# Patient Record
Sex: FEMALE | Race: BLACK OR AFRICAN AMERICAN | NOT HISPANIC OR LATINO | Employment: OTHER | URBAN - NONMETROPOLITAN AREA
[De-identification: names, ages, dates, MRNs, and addresses within clinical notes are randomized per-mention and may not be internally consistent; named-entity substitution may affect disease eponyms.]

---

## 2023-01-25 PROBLEM — D50.9 MICROCYTIC ANEMIA: Status: ACTIVE | Noted: 2023-01-25

## 2023-01-25 PROBLEM — R10.30 LOWER ABDOMINAL PAIN: Status: ACTIVE | Noted: 2023-01-25

## 2023-01-25 PROBLEM — D63.1 ANEMIA IN STAGE 3A CHRONIC KIDNEY DISEASE (MULTI): Status: ACTIVE | Noted: 2023-01-25

## 2023-01-25 PROBLEM — R06.02 SOB (SHORTNESS OF BREATH) ON EXERTION: Status: ACTIVE | Noted: 2023-01-25

## 2023-01-25 PROBLEM — M25.50 JOINT PAIN: Status: ACTIVE | Noted: 2023-01-25

## 2023-01-25 PROBLEM — R53.82 CHRONIC FATIGUE: Status: ACTIVE | Noted: 2023-01-25

## 2023-01-25 PROBLEM — E78.5 HYPERLIPIDEMIA: Status: ACTIVE | Noted: 2023-01-25

## 2023-01-25 PROBLEM — R26.81 UNSTABLE GAIT: Status: ACTIVE | Noted: 2023-01-25

## 2023-01-25 PROBLEM — J44.9 COPD (CHRONIC OBSTRUCTIVE PULMONARY DISEASE) (MULTI): Status: ACTIVE | Noted: 2023-01-25

## 2023-01-25 PROBLEM — M54.50 LOW BACK PAIN: Status: ACTIVE | Noted: 2023-01-25

## 2023-01-25 PROBLEM — R07.9 EXERTIONAL CHEST PAIN: Status: ACTIVE | Noted: 2023-01-25

## 2023-01-25 PROBLEM — I10 BENIGN ESSENTIAL HYPERTENSION: Status: ACTIVE | Noted: 2023-01-25

## 2023-01-25 PROBLEM — N18.31 ANEMIA IN STAGE 3A CHRONIC KIDNEY DISEASE (MULTI): Status: ACTIVE | Noted: 2023-01-25

## 2023-01-25 PROBLEM — E11.9 TYPE 2 DIABETES MELLITUS (MULTI): Status: ACTIVE | Noted: 2023-01-25

## 2023-01-25 PROBLEM — M17.10 ARTHRITIS OF KNEE: Status: ACTIVE | Noted: 2023-01-25

## 2023-01-25 PROBLEM — N39.0 UTI (URINARY TRACT INFECTION): Status: ACTIVE | Noted: 2023-01-25

## 2023-01-25 PROBLEM — R31.9 HEMATURIA: Status: ACTIVE | Noted: 2023-01-25

## 2023-01-25 PROBLEM — M25.562 LEFT KNEE PAIN: Status: ACTIVE | Noted: 2023-01-25

## 2023-01-25 PROBLEM — E55.9 VITAMIN D DEFICIENCY: Status: ACTIVE | Noted: 2023-01-25

## 2023-01-25 PROBLEM — J45.909 ASTHMA (HHS-HCC): Status: ACTIVE | Noted: 2023-01-25

## 2023-01-25 PROBLEM — M79.89 LEG SWELLING: Status: ACTIVE | Noted: 2023-01-25

## 2023-01-25 PROBLEM — R35.0 FREQUENCY OF URINATION: Status: ACTIVE | Noted: 2023-01-25

## 2023-01-25 PROBLEM — M10.9 GOUT: Status: ACTIVE | Noted: 2023-01-25

## 2023-01-25 PROBLEM — E11.40 DIABETIC NEUROPATHY (MULTI): Status: ACTIVE | Noted: 2023-01-25

## 2023-01-25 RX ORDER — ALBUTEROL SULFATE 90 UG/1
2 AEROSOL, METERED RESPIRATORY (INHALATION) EVERY 4 HOURS PRN
COMMUNITY
Start: 2017-09-07 | End: 2023-12-27 | Stop reason: SDUPTHER

## 2023-01-25 RX ORDER — ASPIRIN 81 MG/1
81 TABLET ORAL DAILY
COMMUNITY
Start: 2022-06-13

## 2023-01-25 RX ORDER — FUROSEMIDE 20 MG/1
20 TABLET ORAL DAILY
COMMUNITY
Start: 2022-03-09 | End: 2023-05-22 | Stop reason: SDUPTHER

## 2023-01-25 RX ORDER — LOVASTATIN 20 MG/1
20 TABLET ORAL
COMMUNITY
Start: 2015-10-01 | End: 2023-05-22 | Stop reason: SDUPTHER

## 2023-01-25 RX ORDER — GLIPIZIDE 5 MG/1
5 TABLET ORAL
COMMUNITY
Start: 2015-10-01 | End: 2023-05-22 | Stop reason: SDUPTHER

## 2023-01-25 RX ORDER — LIDOCAINE 560 MG/1
PATCH PERCUTANEOUS; TOPICAL; TRANSDERMAL AS NEEDED
COMMUNITY
Start: 2021-10-07

## 2023-01-25 RX ORDER — CHOLECALCIFEROL (VITAMIN D3) 125 MCG
1 CAPSULE ORAL
COMMUNITY

## 2023-01-25 RX ORDER — ALLOPURINOL 100 MG/1
2 TABLET ORAL DAILY
COMMUNITY
Start: 2021-10-07 | End: 2023-05-22 | Stop reason: SDUPTHER

## 2023-01-25 RX ORDER — METFORMIN HYDROCHLORIDE 500 MG/1
500 TABLET, EXTENDED RELEASE ORAL EVERY 12 HOURS
COMMUNITY
Start: 2022-04-12 | End: 2023-05-22 | Stop reason: SDUPTHER

## 2023-01-25 RX ORDER — LISINOPRIL AND HYDROCHLOROTHIAZIDE 12.5; 2 MG/1; MG/1
1 TABLET ORAL DAILY
COMMUNITY
Start: 2015-10-07 | End: 2023-02-06 | Stop reason: ENTERED-IN-ERROR

## 2023-02-06 PROBLEM — Z99.81 OXYGEN DEPENDENT: Status: ACTIVE | Noted: 2023-02-06

## 2023-02-06 RX ORDER — BUDESONIDE, GLYCOPYRROLATE, AND FORMOTEROL FUMARATE 160; 9; 4.8 UG/1; UG/1; UG/1
2 AEROSOL, METERED RESPIRATORY (INHALATION) 2 TIMES DAILY
COMMUNITY
End: 2023-12-27 | Stop reason: SDUPTHER

## 2023-02-06 RX ORDER — ALBUTEROL SULFATE 0.83 MG/ML
2.5 SOLUTION RESPIRATORY (INHALATION)
COMMUNITY
End: 2023-12-27 | Stop reason: SDUPTHER

## 2023-02-06 RX ORDER — FERROUS GLUCONATE 325 MG
1 TABLET ORAL DAILY
COMMUNITY
End: 2023-05-22 | Stop reason: SDUPTHER

## 2023-02-06 RX ORDER — LISINOPRIL 20 MG/1
20 TABLET ORAL DAILY
COMMUNITY
End: 2023-05-19 | Stop reason: SDUPTHER

## 2023-02-06 RX ORDER — BLOOD SUGAR DIAGNOSTIC
STRIP MISCELLANEOUS
COMMUNITY
End: 2023-05-22 | Stop reason: SDUPTHER

## 2023-02-06 RX ORDER — LANCETS
EACH MISCELLANEOUS
COMMUNITY

## 2023-02-20 LAB — URATE (MG/DL) IN SER/PLAS: 5.1 MG/DL (ref 2.3–6.7)

## 2023-03-09 ENCOUNTER — APPOINTMENT (OUTPATIENT)
Dept: PRIMARY CARE | Facility: CLINIC | Age: 78
End: 2023-03-09
Payer: MEDICARE

## 2023-05-19 DIAGNOSIS — I10 ESSENTIAL (PRIMARY) HYPERTENSION: ICD-10-CM

## 2023-05-19 RX ORDER — LISINOPRIL 20 MG/1
TABLET ORAL
Qty: 90 TABLET | Refills: 0 | Status: SHIPPED | OUTPATIENT
Start: 2023-05-19 | End: 2023-05-22 | Stop reason: SDUPTHER

## 2023-05-22 ENCOUNTER — OFFICE VISIT (OUTPATIENT)
Dept: PRIMARY CARE | Facility: CLINIC | Age: 78
End: 2023-05-22
Payer: MEDICARE

## 2023-05-22 VITALS
WEIGHT: 126.2 LBS | TEMPERATURE: 97.6 F | SYSTOLIC BLOOD PRESSURE: 112 MMHG | DIASTOLIC BLOOD PRESSURE: 64 MMHG | BODY MASS INDEX: 24.77 KG/M2 | HEIGHT: 60 IN | OXYGEN SATURATION: 94 % | HEART RATE: 76 BPM

## 2023-05-22 DIAGNOSIS — I73.9 PVD (PERIPHERAL VASCULAR DISEASE) (CMS-HCC): ICD-10-CM

## 2023-05-22 DIAGNOSIS — D63.1 ANEMIA IN STAGE 3A CHRONIC KIDNEY DISEASE (MULTI): ICD-10-CM

## 2023-05-22 DIAGNOSIS — N18.31 ANEMIA IN STAGE 3A CHRONIC KIDNEY DISEASE (MULTI): ICD-10-CM

## 2023-05-22 DIAGNOSIS — E11.69 TYPE 2 DIABETES MELLITUS WITH OTHER SPECIFIED COMPLICATION, UNSPECIFIED WHETHER LONG TERM INSULIN USE (MULTI): ICD-10-CM

## 2023-05-22 DIAGNOSIS — E53.8 VITAMIN B12 DEFICIENCY: ICD-10-CM

## 2023-05-22 DIAGNOSIS — R53.83 OTHER FATIGUE: ICD-10-CM

## 2023-05-22 DIAGNOSIS — M06.09 RHEUMATOID ARTHRITIS OF MULTIPLE SITES WITH NEGATIVE RHEUMATOID FACTOR (MULTI): ICD-10-CM

## 2023-05-22 DIAGNOSIS — Z00.00 ROUTINE GENERAL MEDICAL EXAMINATION AT HEALTH CARE FACILITY: Primary | ICD-10-CM

## 2023-05-22 DIAGNOSIS — E55.9 VITAMIN D DEFICIENCY, UNSPECIFIED: ICD-10-CM

## 2023-05-22 DIAGNOSIS — E78.5 HYPERLIPIDEMIA, UNSPECIFIED HYPERLIPIDEMIA TYPE: ICD-10-CM

## 2023-05-22 DIAGNOSIS — Z79.899 HIGH RISK MEDICATION USE: ICD-10-CM

## 2023-05-22 DIAGNOSIS — Z12.31 ENCOUNTER FOR SCREENING MAMMOGRAM FOR MALIGNANT NEOPLASM OF BREAST: ICD-10-CM

## 2023-05-22 DIAGNOSIS — I10 BENIGN ESSENTIAL HYPERTENSION: ICD-10-CM

## 2023-05-22 DIAGNOSIS — I10 ESSENTIAL (PRIMARY) HYPERTENSION: ICD-10-CM

## 2023-05-22 DIAGNOSIS — E78.00 HYPERCHOLESTEREMIA: ICD-10-CM

## 2023-05-22 DIAGNOSIS — I10 HYPERTENSION, UNSPECIFIED TYPE: ICD-10-CM

## 2023-05-22 DIAGNOSIS — M1A.0790 CHRONIC GOUT OF FOOT, UNSPECIFIED CAUSE, UNSPECIFIED LATERALITY: ICD-10-CM

## 2023-05-22 DIAGNOSIS — Z13.89 SCREENING FOR MULTIPLE CONDITIONS: ICD-10-CM

## 2023-05-22 DIAGNOSIS — J96.91 RESPIRATORY FAILURE WITH HYPOXIA, UNSPECIFIED CHRONICITY (MULTI): ICD-10-CM

## 2023-05-22 LAB
POC FINGERSTICK BLOOD GLUCOSE: 134 MG/DL (ref 70–100)
POC HEMOGLOBIN A1C: 5.6 % (ref 4.2–6.5)

## 2023-05-22 PROCEDURE — G0444 DEPRESSION SCREEN ANNUAL: HCPCS | Performed by: INTERNAL MEDICINE

## 2023-05-22 PROCEDURE — 3078F DIAST BP <80 MM HG: CPT | Performed by: INTERNAL MEDICINE

## 2023-05-22 PROCEDURE — 83036 HEMOGLOBIN GLYCOSYLATED A1C: CPT | Performed by: INTERNAL MEDICINE

## 2023-05-22 PROCEDURE — 1159F MED LIST DOCD IN RCRD: CPT | Performed by: INTERNAL MEDICINE

## 2023-05-22 PROCEDURE — 3074F SYST BP LT 130 MM HG: CPT | Performed by: INTERNAL MEDICINE

## 2023-05-22 PROCEDURE — 82962 GLUCOSE BLOOD TEST: CPT | Performed by: INTERNAL MEDICINE

## 2023-05-22 PROCEDURE — 1036F TOBACCO NON-USER: CPT | Performed by: INTERNAL MEDICINE

## 2023-05-22 PROCEDURE — 1160F RVW MEDS BY RX/DR IN RCRD: CPT | Performed by: INTERNAL MEDICINE

## 2023-05-22 PROCEDURE — G0439 PPPS, SUBSEQ VISIT: HCPCS | Performed by: INTERNAL MEDICINE

## 2023-05-22 PROCEDURE — 99214 OFFICE O/P EST MOD 30 MIN: CPT | Performed by: INTERNAL MEDICINE

## 2023-05-22 PROCEDURE — 99397 PER PM REEVAL EST PAT 65+ YR: CPT | Performed by: INTERNAL MEDICINE

## 2023-05-22 PROCEDURE — 1170F FXNL STATUS ASSESSED: CPT | Performed by: INTERNAL MEDICINE

## 2023-05-22 RX ORDER — PNEUMOCOCCAL 20-VALENT CONJUGATE VACCINE 2.2; 2.2; 2.2; 2.2; 2.2; 2.2; 2.2; 2.2; 2.2; 2.2; 2.2; 2.2; 2.2; 2.2; 2.2; 2.2; 4.4; 2.2; 2.2; 2.2 UG/.5ML; UG/.5ML; UG/.5ML; UG/.5ML; UG/.5ML; UG/.5ML; UG/.5ML; UG/.5ML; UG/.5ML; UG/.5ML; UG/.5ML; UG/.5ML; UG/.5ML; UG/.5ML; UG/.5ML; UG/.5ML; UG/.5ML; UG/.5ML; UG/.5ML; UG/.5ML
0.5 INJECTION, SUSPENSION INTRAMUSCULAR ONCE
Qty: 0.5 ML | Refills: 0 | Status: SHIPPED | OUTPATIENT
Start: 2023-05-22 | End: 2023-05-22

## 2023-05-22 RX ORDER — LISINOPRIL 20 MG/1
20 TABLET ORAL DAILY
Qty: 90 TABLET | Refills: 1 | Status: SHIPPED | OUTPATIENT
Start: 2023-05-22 | End: 2023-11-06

## 2023-05-22 RX ORDER — LOVASTATIN 20 MG/1
20 TABLET ORAL
Qty: 90 TABLET | Refills: 1 | Status: SHIPPED | OUTPATIENT
Start: 2023-05-22 | End: 2023-11-20

## 2023-05-22 RX ORDER — ALLOPURINOL 100 MG/1
200 TABLET ORAL 2 TIMES DAILY
Qty: 180 TABLET | Refills: 1 | Status: SHIPPED | OUTPATIENT
Start: 2023-05-22 | End: 2023-05-22 | Stop reason: SINTOL

## 2023-05-22 RX ORDER — METFORMIN HYDROCHLORIDE 500 MG/1
500 TABLET, EXTENDED RELEASE ORAL EVERY 12 HOURS
Qty: 90 TABLET | Refills: 1 | Status: SHIPPED | OUTPATIENT
Start: 2023-05-22 | End: 2023-08-23

## 2023-05-22 RX ORDER — BLOOD SUGAR DIAGNOSTIC
1 STRIP MISCELLANEOUS 2 TIMES DAILY
Qty: 50 STRIP | Refills: 3 | Status: SHIPPED | OUTPATIENT
Start: 2023-05-22 | End: 2023-07-17 | Stop reason: SDUPTHER

## 2023-05-22 RX ORDER — FERROUS GLUCONATE 325 MG
1 TABLET ORAL
Qty: 90 TABLET | Refills: 1 | Status: SHIPPED | OUTPATIENT
Start: 2023-05-22 | End: 2024-01-29

## 2023-05-22 RX ORDER — FUROSEMIDE 20 MG/1
20 TABLET ORAL DAILY
Qty: 90 TABLET | Refills: 1 | Status: SHIPPED | OUTPATIENT
Start: 2023-05-22 | End: 2023-08-31

## 2023-05-22 RX ORDER — GLIPIZIDE 5 MG/1
5 TABLET ORAL
Qty: 180 TABLET | Refills: 1 | Status: SHIPPED | OUTPATIENT
Start: 2023-05-22 | End: 2023-11-28

## 2023-05-22 ASSESSMENT — PATIENT HEALTH QUESTIONNAIRE - PHQ9
1. LITTLE INTEREST OR PLEASURE IN DOING THINGS: NOT AT ALL
2. FEELING DOWN, DEPRESSED OR HOPELESS: NOT AT ALL
SUM OF ALL RESPONSES TO PHQ9 QUESTIONS 1 AND 2: 0

## 2023-05-22 ASSESSMENT — ACTIVITIES OF DAILY LIVING (ADL)
BATHING: INDEPENDENT
BATHING: INDEPENDENT
GROCERY_SHOPPING: INDEPENDENT
MANAGING_FINANCES: INDEPENDENT
DRESSING: INDEPENDENT
TAKING_MEDICATION: INDEPENDENT
DOING_HOUSEWORK: TOTAL CARE

## 2023-05-22 ASSESSMENT — ENCOUNTER SYMPTOMS
UNEXPECTED WEIGHT CHANGE: 0
DIFFICULTY URINATING: 0
COUGH: 0
SORE THROAT: 0
PALPITATIONS: 0
HYPERTENSION: 1
ABDOMINAL PAIN: 0
CONSTIPATION: 1
BLOOD IN STOOL: 0
DEPRESSION: 0
WHEEZING: 0
HEADACHES: 0
DIARRHEA: 0
BRUISES/BLEEDS EASILY: 0
SINUS PAIN: 0
FEVER: 0
FATIGUE: 0
DIZZINESS: 0
OCCASIONAL FEELINGS OF UNSTEADINESS: 1
ARTHRALGIAS: 0
LOSS OF SENSATION IN FEET: 0

## 2023-05-22 NOTE — PROGRESS NOTES
"Subjective   Reason for Visit: Hali Christianson is an 78 y.o. female here for a Medicare Wellness visit.     Past Medical, Surgical, and Family History reviewed and updated in chart.    Reviewed all medications by prescribing practitioner or clinical pharmacist (such as prescriptions, OTCs, herbal therapies and supplements) and documented in the medical record.    Annual preventive visit  - Vaccinations needs a booster for pneumonia new prescription sent to the pharmacy today  - Needs screening mammogram  - Needs to complete blood work  -    Medicare  Screening for depression  I spent 15 minutes obtaining and discussing depression screening using PHQ-2 questions with results documented in the chart.    Follow-up  - Chronic gout patient did not tolerate allopurinol made her confused and sleepy patient discontinued still high uric acid  Underlying renal insufficiency refer patient to nephrology DR DEMARCO for further recommendation  - Iron deficiency anemia continue with iron supplement â€\" Vitamin D deficiency vitamin D very high need to change vitamin D 5000 to take it only once a week  -Chronic renal insufficiency continue with current medication refer patient to nephrology DR DEMARCO  -Chronic respiratory failure continues 2 L oxygen  - Peripheral vascular disease symptomatic follow-up and referral to vascular surgery today  - Lung nodule continue monitoring by pulmonary  - Diabetes controlled   - Follow-up 3 months    Diabetes  Pertinent negatives for hypoglycemia include no dizziness or headaches. Pertinent negatives for diabetes include no chest pain and no fatigue.   Hypertension  Pertinent negatives include no chest pain, headaches or palpitations.   Arthritis  Pertinent negatives include no diarrhea, fatigue, fever or rash.   Anemia  There has been no abdominal pain, bruising/bleeding easily, fever or palpitations.   Neuropathy      Constipation  Pertinent negatives include no abdominal pain, diarrhea, " difficulty urinating or fever.       Patient Care Team:  Alvin Davis MD as PCP - General     Review of Systems   Constitutional:  Negative for fatigue, fever and unexpected weight change.   HENT:  Negative for congestion, ear discharge, ear pain, mouth sores, sinus pain and sore throat.    Eyes:  Negative for visual disturbance.   Respiratory:  Negative for cough and wheezing.    Cardiovascular:  Negative for chest pain, palpitations and leg swelling.   Gastrointestinal:  Positive for constipation. Negative for abdominal pain, blood in stool and diarrhea.   Genitourinary:  Negative for difficulty urinating.   Musculoskeletal:  Positive for arthritis. Negative for arthralgias.   Skin:  Negative for rash.   Neurological:  Negative for dizziness and headaches.   Hematological:  Does not bruise/bleed easily.   Psychiatric/Behavioral:  Negative for behavioral problems.    All other systems reviewed and are negative.      Objective   Vitals:  /64   Pulse 76   Temp 36.4 °C (97.6 °F)   Ht 1.524 m (5')   Wt 57.2 kg (126 lb 3.2 oz)   SpO2 94%   BMI 24.65 kg/m²       Physical Exam  Vitals and nursing note reviewed.   Constitutional:       Appearance: Normal appearance.   HENT:      Head: Normocephalic.      Nose: Nose normal.   Eyes:      Conjunctiva/sclera: Conjunctivae normal.      Pupils: Pupils are equal, round, and reactive to light.   Cardiovascular:      Rate and Rhythm: Regular rhythm.   Pulmonary:      Effort: Pulmonary effort is normal.      Breath sounds: Normal breath sounds.   Abdominal:      General: Abdomen is flat.      Palpations: Abdomen is soft.   Musculoskeletal:      Cervical back: Neck supple.   Skin:     General: Skin is warm.   Neurological:      General: No focal deficit present.      Mental Status: She is oriented to person, place, and time.   Psychiatric:         Mood and Affect: Mood normal.         Assessment/Plan   Problem List Items Addressed This Visit          Respiratory     Respiratory failure with hypoxia, unspecified chronicity (CMS/HCC)       Circulatory    Benign essential hypertension    Relevant Medications    furosemide (Lasix) 20 mg tablet    PVD (peripheral vascular disease) (CMS/HCC)       Genitourinary    Anemia in stage 3a chronic kidney disease    Relevant Medications    ferrous gluconate (Fergon) 324 (38 Fe) mg tablet       Endocrine/Metabolic    Type 2 diabetes mellitus (CMS/HCC)    Relevant Medications    glipiZIDE (Glucotrol) 5 mg tablet    metFORMIN XR (Glucophage-XR) 500 mg 24 hr tablet    blood sugar diagnostic (Prodigy No Coding) strip    Other Relevant Orders    POCT fingerstick glucose manually resulted (Completed)    POCT glycosylated hemoglobin (Hb A1C) manually resulted (Completed)       Other    Gout    Relevant Orders    Referral to Nephrology    Hyperlipidemia    Relevant Medications    lovastatin (Mevacor) 20 mg tablet    Rheumatoid arthritis of multiple sites with negative rheumatoid factor (CMS/HCC)     Other Visit Diagnoses       Routine general medical examination at health care facility    -  Primary    Relevant Medications    pneumoc 20-yuval conj-dip cr,PF, (Prevnar 20, PF,) 0.5 mL vaccine    Other Relevant Orders    BI mammo bilateral screening tomosynthesis    CBC and Auto Differential    Comprehensive Metabolic Panel    Lipid Panel    TSH with reflex to Free T4 if abnormal    Vitamin B12    Vitamin D, Total    Essential (primary) hypertension        Relevant Medications    lisinopril 20 mg tablet    Screening for multiple conditions        Encounter for screening mammogram for malignant neoplasm of breast        Relevant Orders    BI mammo bilateral screening tomosynthesis    High risk medication use        Relevant Orders    CBC and Auto Differential    Hypertension, unspecified type        Relevant Orders    Comprehensive Metabolic Panel    Hypercholesteremia        Relevant Orders    Lipid Panel    Other fatigue        Relevant Orders    TSH  "with reflex to Free T4 if abnormal    Vitamin B12 deficiency        Relevant Orders    Vitamin B12    Vitamin D deficiency, unspecified        Relevant Orders    Vitamin D, Total        Annual preventive visit  - Vaccinations needs a booster for pneumonia new prescription sent to the pharmacy today  - Needs screening mammogram  - Needs to complete blood work  -    Medicare  Screening for depression  I spent 15 minutes obtaining and discussing depression screening using PHQ-2 questions with results documented in the chart.    Follow-up  - Chronic gout patient did not tolerate allopurinol made her confused and sleepy patient discontinued still high uric acid  Underlying renal insufficiency refer patient to nephrology DR DEMARCO for further recommendation  - Iron deficiency anemia continue with iron supplement â€\" Vitamin D deficiency vitamin D very high need to change vitamin D 5000 to take it only once a week  -Chronic renal insufficiency continue with current medication refer patient to nephrology DR DEMARCO  -Chronic respiratory failure continues 2 L oxygen  - Peripheral vascular disease symptomatic follow-up and referral to vascular surgery today  - Lung nodule continue monitoring by pulmonary  - Diabetes controlled   - Follow-up 3 months       "

## 2023-07-17 DIAGNOSIS — E11.69 TYPE 2 DIABETES MELLITUS WITH OTHER SPECIFIED COMPLICATION, UNSPECIFIED WHETHER LONG TERM INSULIN USE (MULTI): ICD-10-CM

## 2023-07-17 RX ORDER — BLOOD SUGAR DIAGNOSTIC
1 STRIP MISCELLANEOUS 2 TIMES DAILY
Qty: 200 STRIP | Refills: 1 | Status: SHIPPED | OUTPATIENT
Start: 2023-07-17 | End: 2023-10-11 | Stop reason: SDUPTHER

## 2023-08-16 ENCOUNTER — LAB (OUTPATIENT)
Dept: LAB | Facility: LAB | Age: 78
End: 2023-08-16
Payer: MEDICARE

## 2023-08-16 DIAGNOSIS — Z79.899 HIGH RISK MEDICATION USE: ICD-10-CM

## 2023-08-16 DIAGNOSIS — R53.83 OTHER FATIGUE: ICD-10-CM

## 2023-08-16 DIAGNOSIS — Z00.00 ROUTINE GENERAL MEDICAL EXAMINATION AT HEALTH CARE FACILITY: ICD-10-CM

## 2023-08-16 DIAGNOSIS — I10 HYPERTENSION, UNSPECIFIED TYPE: ICD-10-CM

## 2023-08-16 DIAGNOSIS — E53.8 VITAMIN B12 DEFICIENCY: ICD-10-CM

## 2023-08-16 DIAGNOSIS — E55.9 VITAMIN D DEFICIENCY, UNSPECIFIED: ICD-10-CM

## 2023-08-16 DIAGNOSIS — E78.00 HYPERCHOLESTEREMIA: ICD-10-CM

## 2023-08-16 LAB
ALANINE AMINOTRANSFERASE (SGPT) (U/L) IN SER/PLAS: 15 U/L (ref 7–45)
ALBUMIN (G/DL) IN SER/PLAS: 4.2 G/DL (ref 3.4–5)
ALKALINE PHOSPHATASE (U/L) IN SER/PLAS: 59 U/L (ref 33–136)
ANION GAP IN SER/PLAS: 10 MMOL/L (ref 10–20)
ASPARTATE AMINOTRANSFERASE (SGOT) (U/L) IN SER/PLAS: 17 U/L (ref 9–39)
BASOPHILS (10*3/UL) IN BLOOD BY AUTOMATED COUNT: 0.05 X10E9/L (ref 0–0.1)
BASOPHILS/100 LEUKOCYTES IN BLOOD BY AUTOMATED COUNT: 0.9 % (ref 0–2)
BILIRUBIN TOTAL (MG/DL) IN SER/PLAS: 0.4 MG/DL (ref 0–1.2)
CALCIDIOL (25 OH VITAMIN D3) (NG/ML) IN SER/PLAS: 51 NG/ML
CALCIUM (MG/DL) IN SER/PLAS: 9.3 MG/DL (ref 8.6–10.3)
CARBON DIOXIDE, TOTAL (MMOL/L) IN SER/PLAS: 26 MMOL/L (ref 21–32)
CHLORIDE (MMOL/L) IN SER/PLAS: 110 MMOL/L (ref 98–107)
CHOLESTEROL (MG/DL) IN SER/PLAS: 154 MG/DL (ref 0–199)
CHOLESTEROL IN HDL (MG/DL) IN SER/PLAS: 49.9 MG/DL
CHOLESTEROL/HDL RATIO: 3.1
COBALAMIN (VITAMIN B12) (PG/ML) IN SER/PLAS: 462 PG/ML (ref 211–911)
CREATININE (MG/DL) IN SER/PLAS: 0.9 MG/DL (ref 0.5–1.05)
EOSINOPHILS (10*3/UL) IN BLOOD BY AUTOMATED COUNT: 0.14 X10E9/L (ref 0–0.4)
EOSINOPHILS/100 LEUKOCYTES IN BLOOD BY AUTOMATED COUNT: 2.5 % (ref 0–6)
ERYTHROCYTE DISTRIBUTION WIDTH (RATIO) BY AUTOMATED COUNT: 14 % (ref 11.5–14.5)
ERYTHROCYTE MEAN CORPUSCULAR HEMOGLOBIN CONCENTRATION (G/DL) BY AUTOMATED: 31.3 G/DL (ref 32–36)
ERYTHROCYTE MEAN CORPUSCULAR VOLUME (FL) BY AUTOMATED COUNT: 95 FL (ref 80–100)
ERYTHROCYTES (10*6/UL) IN BLOOD BY AUTOMATED COUNT: 5.14 X10E12/L (ref 4–5.2)
GFR FEMALE: 64 ML/MIN/1.73M2
GLUCOSE (MG/DL) IN SER/PLAS: 141 MG/DL (ref 74–99)
HEMATOCRIT (%) IN BLOOD BY AUTOMATED COUNT: 48.6 % (ref 36–46)
HEMOGLOBIN (G/DL) IN BLOOD: 15.2 G/DL (ref 12–16)
IMMATURE GRANULOCYTES/100 LEUKOCYTES IN BLOOD BY AUTOMATED COUNT: 0.2 % (ref 0–0.9)
LDL: 70 MG/DL (ref 0–99)
LEUKOCYTES (10*3/UL) IN BLOOD BY AUTOMATED COUNT: 5.7 X10E9/L (ref 4.4–11.3)
LYMPHOCYTES (10*3/UL) IN BLOOD BY AUTOMATED COUNT: 1.16 X10E9/L (ref 0.8–3)
LYMPHOCYTES/100 LEUKOCYTES IN BLOOD BY AUTOMATED COUNT: 20.5 % (ref 13–44)
MONOCYTES (10*3/UL) IN BLOOD BY AUTOMATED COUNT: 0.44 X10E9/L (ref 0.05–0.8)
MONOCYTES/100 LEUKOCYTES IN BLOOD BY AUTOMATED COUNT: 7.8 % (ref 2–10)
NEUTROPHILS (10*3/UL) IN BLOOD BY AUTOMATED COUNT: 3.87 X10E9/L (ref 1.6–5.5)
NEUTROPHILS/100 LEUKOCYTES IN BLOOD BY AUTOMATED COUNT: 68.1 % (ref 40–80)
PLATELETS (10*3/UL) IN BLOOD AUTOMATED COUNT: 228 X10E9/L (ref 150–450)
POTASSIUM (MMOL/L) IN SER/PLAS: 4.2 MMOL/L (ref 3.5–5.3)
PROTEIN TOTAL: 6.4 G/DL (ref 6.4–8.2)
SODIUM (MMOL/L) IN SER/PLAS: 142 MMOL/L (ref 136–145)
THYROTROPIN (MIU/L) IN SER/PLAS BY DETECTION LIMIT <= 0.05 MIU/L: 1.89 MIU/L (ref 0.44–3.98)
TRIGLYCERIDE (MG/DL) IN SER/PLAS: 169 MG/DL (ref 0–149)
UREA NITROGEN (MG/DL) IN SER/PLAS: 12 MG/DL (ref 6–23)
VLDL: 34 MG/DL (ref 0–40)

## 2023-08-16 PROCEDURE — 82607 VITAMIN B-12: CPT

## 2023-08-16 PROCEDURE — 80061 LIPID PANEL: CPT

## 2023-08-16 PROCEDURE — 82306 VITAMIN D 25 HYDROXY: CPT

## 2023-08-16 PROCEDURE — 85025 COMPLETE CBC W/AUTO DIFF WBC: CPT

## 2023-08-16 PROCEDURE — 36415 COLL VENOUS BLD VENIPUNCTURE: CPT

## 2023-08-16 PROCEDURE — 80053 COMPREHEN METABOLIC PANEL: CPT

## 2023-08-16 PROCEDURE — 84443 ASSAY THYROID STIM HORMONE: CPT

## 2023-08-22 ENCOUNTER — APPOINTMENT (OUTPATIENT)
Dept: PRIMARY CARE | Facility: CLINIC | Age: 78
End: 2023-08-22
Payer: MEDICARE

## 2023-08-23 DIAGNOSIS — E11.69 TYPE 2 DIABETES MELLITUS WITH OTHER SPECIFIED COMPLICATION, UNSPECIFIED WHETHER LONG TERM INSULIN USE (MULTI): ICD-10-CM

## 2023-08-23 RX ORDER — METFORMIN HYDROCHLORIDE 500 MG/1
500 TABLET, EXTENDED RELEASE ORAL EVERY 12 HOURS
Qty: 180 TABLET | Refills: 1 | Status: SHIPPED | OUTPATIENT
Start: 2023-08-23 | End: 2024-01-11

## 2023-08-31 ENCOUNTER — OFFICE VISIT (OUTPATIENT)
Dept: PRIMARY CARE | Facility: CLINIC | Age: 78
End: 2023-08-31
Payer: MEDICARE

## 2023-08-31 VITALS
SYSTOLIC BLOOD PRESSURE: 172 MMHG | HEART RATE: 68 BPM | TEMPERATURE: 97.6 F | WEIGHT: 128 LBS | OXYGEN SATURATION: 93 % | BODY MASS INDEX: 25 KG/M2 | DIASTOLIC BLOOD PRESSURE: 74 MMHG

## 2023-08-31 DIAGNOSIS — E78.5 HYPERLIPIDEMIA, UNSPECIFIED HYPERLIPIDEMIA TYPE: ICD-10-CM

## 2023-08-31 DIAGNOSIS — M54.16 RIGHT LUMBAR RADICULITIS: ICD-10-CM

## 2023-08-31 DIAGNOSIS — J96.91 RESPIRATORY FAILURE WITH HYPOXIA, UNSPECIFIED CHRONICITY (MULTI): ICD-10-CM

## 2023-08-31 DIAGNOSIS — I10 ESSENTIAL (PRIMARY) HYPERTENSION: Primary | ICD-10-CM

## 2023-08-31 DIAGNOSIS — I10 BENIGN ESSENTIAL HYPERTENSION: ICD-10-CM

## 2023-08-31 DIAGNOSIS — M1A.0790 CHRONIC GOUT OF FOOT, UNSPECIFIED CAUSE, UNSPECIFIED LATERALITY: ICD-10-CM

## 2023-08-31 PROBLEM — N39.0 UTI (URINARY TRACT INFECTION): Status: RESOLVED | Noted: 2023-01-25 | Resolved: 2023-08-31

## 2023-08-31 PROBLEM — H35.3132 NONEXUDATIVE AGE-RELATED MACULAR DEGENERATION, BILATERAL, INTERMEDIATE DRY STAGE: Status: ACTIVE | Noted: 2020-03-05

## 2023-08-31 PROBLEM — H26.493 PCO (POSTERIOR CAPSULAR OPACIFICATION), BILATERAL: Status: ACTIVE | Noted: 2020-03-05

## 2023-08-31 PROBLEM — F51.9 NONORGANIC SLEEP DISORDER: Status: ACTIVE | Noted: 2023-08-31

## 2023-08-31 PROBLEM — E87.8 ELECTROLYTE AND FLUID DISORDER: Status: ACTIVE | Noted: 2023-08-31

## 2023-08-31 PROBLEM — M54.50 LOW BACK PAIN, UNSPECIFIED: Status: ACTIVE | Noted: 2021-09-25

## 2023-08-31 PROBLEM — J96.11 CHRONIC HYPOXEMIC RESPIRATORY FAILURE (MULTI): Status: ACTIVE | Noted: 2021-12-03

## 2023-08-31 PROCEDURE — 1125F AMNT PAIN NOTED PAIN PRSNT: CPT | Performed by: INTERNAL MEDICINE

## 2023-08-31 PROCEDURE — 1159F MED LIST DOCD IN RCRD: CPT | Performed by: INTERNAL MEDICINE

## 2023-08-31 PROCEDURE — 1160F RVW MEDS BY RX/DR IN RCRD: CPT | Performed by: INTERNAL MEDICINE

## 2023-08-31 PROCEDURE — 3077F SYST BP >= 140 MM HG: CPT | Performed by: INTERNAL MEDICINE

## 2023-08-31 PROCEDURE — 99214 OFFICE O/P EST MOD 30 MIN: CPT | Performed by: INTERNAL MEDICINE

## 2023-08-31 PROCEDURE — 1036F TOBACCO NON-USER: CPT | Performed by: INTERNAL MEDICINE

## 2023-08-31 PROCEDURE — 3078F DIAST BP <80 MM HG: CPT | Performed by: INTERNAL MEDICINE

## 2023-08-31 RX ORDER — FUROSEMIDE 20 MG/1
20 TABLET ORAL DAILY
Qty: 90 TABLET | Refills: 1 | Status: SHIPPED | OUTPATIENT
Start: 2023-08-31 | End: 2024-01-29

## 2023-08-31 RX ORDER — METHYLPREDNISOLONE 4 MG/1
TABLET ORAL
Qty: 21 TABLET | Refills: 0 | Status: SHIPPED | OUTPATIENT
Start: 2023-08-31 | End: 2023-09-07

## 2023-08-31 RX ORDER — GABAPENTIN 100 MG/1
1 CAPSULE ORAL 3 TIMES DAILY
COMMUNITY
Start: 2023-02-20 | End: 2024-03-18 | Stop reason: ALTCHOICE

## 2023-08-31 ASSESSMENT — ENCOUNTER SYMPTOMS
DIFFICULTY URINATING: 0
FEVER: 0
ARTHRALGIAS: 0
PALPITATIONS: 0
SORE THROAT: 0
SINUS PAIN: 0
BACK PAIN: 1
COUGH: 0
LEG PAIN: 1
ABDOMINAL PAIN: 0
UNEXPECTED WEIGHT CHANGE: 0
BRUISES/BLEEDS EASILY: 0
HYPERTENSION: 1
FATIGUE: 0
DIARRHEA: 0
HEADACHES: 0
BLOOD IN STOOL: 0
DIZZINESS: 0
JOINT SWELLING: 1
WHEEZING: 0

## 2023-08-31 NOTE — PROGRESS NOTES
"Subjective   Patient ID: Hali Christianson is a 78 y.o. female who presents for COPD, Hypertension, Asthma, Diabetes, Hyperlipidemia, Leg Pain, and Back Pain (X 3 months).    -Chronic gout patient seen by DR DEMARCO recommended to start allopurinol again patient symptoms not improving awaiting follow-up next month continue with current medication  - Lumbosacral disease right-sided lumbar radiculitis patient did not benefit from epidural before we will give patient prescription for Medrol Dosepak follow-up results closely  - Iron deficiency anemia continue with iron supplement â€\" Vitamin D deficiency vitamin D very high need to change vitamin D 5000 to take it only once a week  -Chronic renal insufficiency continue with current medication refer patient to nephrology DR DEMARCO  -Chronic respiratory failure continues 2 L oxygen  - Peripheral vascular disease symptomatic follow-up and referral to vascular surgery today  - Lung nodule continue monitoring by pulmonary  - Diabetes controlled continue low-carb diet  - Chronic respiratory failure continues 2 L oxygen as recommended compensated,  - Follow-up 3 months      Hypertension  Pertinent negatives include no chest pain, headaches or palpitations.   Asthma  There is no cough or wheezing. Pertinent negatives include no chest pain, ear pain, fever, headaches or sore throat. Her past medical history is significant for asthma.   Diabetes  Pertinent negatives for hypoglycemia include no dizziness or headaches. Pertinent negatives for diabetes include no chest pain and no fatigue.   Hyperlipidemia  Associated symptoms include leg pain. Pertinent negatives include no chest pain.   Leg Pain     Back Pain  Associated symptoms include leg pain. Pertinent negatives include no abdominal pain, chest pain, fever or headaches.          Review of Systems   Constitutional:  Negative for fatigue, fever and unexpected weight change.   HENT:  Negative for congestion, ear discharge, ear " pain, mouth sores, sinus pain and sore throat.    Eyes:  Negative for visual disturbance.   Respiratory:  Negative for cough and wheezing.    Cardiovascular:  Negative for chest pain, palpitations and leg swelling.   Gastrointestinal:  Negative for abdominal pain, blood in stool and diarrhea.   Genitourinary:  Negative for difficulty urinating.   Musculoskeletal:  Positive for back pain and joint swelling. Negative for arthralgias.   Skin:  Negative for rash.   Neurological:  Negative for dizziness and headaches.   Hematological:  Does not bruise/bleed easily.   Psychiatric/Behavioral:  Negative for behavioral problems.    All other systems reviewed and are negative.      Objective   Lab Results   Component Value Date    HGBA1C 5.6 05/22/2023      /74   Pulse 68   Temp 36.4 °C (97.6 °F)   Wt 58.1 kg (128 lb)   SpO2 93%   BMI 25.00 kg/m²   Lab Results   Component Value Date    WBC 5.7 08/16/2023    HGB 15.2 08/16/2023    HCT 48.6 (H) 08/16/2023     08/16/2023    CHOL 154 08/16/2023    TRIG 169 (H) 08/16/2023    HDL 49.9 08/16/2023    ALT 15 08/16/2023    AST 17 08/16/2023     08/16/2023    K 4.2 08/16/2023     (H) 08/16/2023    CREATININE 0.90 08/16/2023    BUN 12 08/16/2023    CO2 26 08/16/2023    TSH 1.89 08/16/2023    HGBA1C 5.6 05/22/2023     par   Physical Exam  Vitals and nursing note reviewed.   Constitutional:       Appearance: Normal appearance.   HENT:      Head: Normocephalic.      Nose: Nose normal.   Eyes:      Conjunctiva/sclera: Conjunctivae normal.      Pupils: Pupils are equal, round, and reactive to light.   Cardiovascular:      Rate and Rhythm: Regular rhythm.   Pulmonary:      Effort: Pulmonary effort is normal.      Breath sounds: Normal breath sounds. No wheezing.   Chest:      Chest wall: Tenderness present.   Abdominal:      General: Abdomen is flat.      Palpations: Abdomen is soft.   Musculoskeletal:         General: Tenderness (Right lumbar radiculitis)  "present.      Cervical back: Neck supple.   Skin:     General: Skin is warm.   Neurological:      General: No focal deficit present.      Mental Status: She is oriented to person, place, and time.   Psychiatric:         Mood and Affect: Mood normal.         Assessment/Plan   Hali was seen today for copd, hypertension, asthma, diabetes, hyperlipidemia, leg pain and back pain.  Diagnoses and all orders for this visit:  Essential (primary) hypertension (Primary)  Hyperlipidemia, unspecified hyperlipidemia type  Chronic gout of foot, unspecified cause, unspecified laterality  Right lumbar radiculitis  -     methylPREDNISolone (Medrol Dospak) 4 mg tablets; Follow schedule on package instructions  Respiratory failure with hypoxia, unspecified chronicity (CMS/HCC)  Other orders  -     3 Month Follow Up In Primary Care  -     Follow Up In Primary Care - Established; Future   -Chronic gout patient seen by DR DEMARCO recommended to start allopurinol again patient symptoms not improving awaiting follow-up next month continue with current medication  - Lumbosacral disease right-sided lumbar radiculitis patient did not benefit from epidural before we will give patient prescription for Medrol Dosepak follow-up results closely  - Iron deficiency anemia continue with iron supplement â€\" Vitamin D deficiency vitamin D very high need to change vitamin D 5000 to take it only once a week  -Chronic renal insufficiency continue with current medication refer patient to nephrology DR DEMARCO  -Chronic respiratory failure continues 2 L oxygen  - Peripheral vascular disease symptomatic follow-up and referral to vascular surgery today  - Lung nodule continue monitoring by pulmonary  - Diabetes controlled continue low-carb diet  - Chronic respiratory failure continues 2 L oxygen as recommended compensated,  - Follow-up 3 months  "

## 2023-09-27 LAB
ALBUMIN (G/DL) IN SER/PLAS: 4 G/DL (ref 3.4–5)
ANION GAP IN SER/PLAS: 14 MMOL/L (ref 10–20)
CALCIUM (MG/DL) IN SER/PLAS: 9 MG/DL (ref 8.6–10.3)
CARBON DIOXIDE, TOTAL (MMOL/L) IN SER/PLAS: 26 MMOL/L (ref 21–32)
CHLORIDE (MMOL/L) IN SER/PLAS: 105 MMOL/L (ref 98–107)
CREATININE (MG/DL) IN SER/PLAS: 0.89 MG/DL (ref 0.5–1.05)
ERYTHROCYTE DISTRIBUTION WIDTH (RATIO) BY AUTOMATED COUNT: 13.4 % (ref 11.5–14.5)
ERYTHROCYTE MEAN CORPUSCULAR HEMOGLOBIN CONCENTRATION (G/DL) BY AUTOMATED: 31.6 G/DL (ref 32–36)
ERYTHROCYTE MEAN CORPUSCULAR VOLUME (FL) BY AUTOMATED COUNT: 91 FL (ref 80–100)
ERYTHROCYTES (10*6/UL) IN BLOOD BY AUTOMATED COUNT: 5.17 X10E12/L (ref 4–5.2)
FERRITIN (UG/LL) IN SER/PLAS: 169 UG/L (ref 8–150)
GFR FEMALE: 65 ML/MIN/1.73M2
GLUCOSE (MG/DL) IN SER/PLAS: 47 MG/DL (ref 74–99)
HEMATOCRIT (%) IN BLOOD BY AUTOMATED COUNT: 47.2 % (ref 36–46)
HEMOGLOBIN (G/DL) IN BLOOD: 14.9 G/DL (ref 12–16)
IRON (UG/DL) IN SER/PLAS: 86 UG/DL (ref 35–150)
IRON BINDING CAPACITY (UG/DL) IN SER/PLAS: 324 UG/DL (ref 240–445)
IRON SATURATION (%) IN SER/PLAS: 27 % (ref 25–45)
LEUKOCYTES (10*3/UL) IN BLOOD BY AUTOMATED COUNT: 7.8 X10E9/L (ref 4.4–11.3)
MAGNESIUM (MG/DL) IN SER/PLAS: 2.03 MG/DL (ref 1.6–2.4)
PHOSPHATE (MG/DL) IN SER/PLAS: 2.5 MG/DL (ref 2.5–4.9)
PLATELETS (10*3/UL) IN BLOOD AUTOMATED COUNT: 287 X10E9/L (ref 150–450)
POTASSIUM (MMOL/L) IN SER/PLAS: 3.1 MMOL/L (ref 3.5–5.3)
SODIUM (MMOL/L) IN SER/PLAS: 142 MMOL/L (ref 136–145)
URATE (MG/DL) IN SER/PLAS: 4.7 MG/DL (ref 2.3–6.7)
UREA NITROGEN (MG/DL) IN SER/PLAS: 15 MG/DL (ref 6–23)

## 2023-09-28 LAB
ALBUMIN (MG/L) IN URINE: 7.7 MG/L
ALBUMIN/CREATININE (UG/MG) IN URINE: 7 UG/MG CRT (ref 0–30)
CALCIDIOL (25 OH VITAMIN D3) (NG/ML) IN SER/PLAS: 41 NG/ML
CREATININE (MG/DL) IN URINE: 110 MG/DL (ref 20–320)

## 2023-10-11 DIAGNOSIS — E11.69 TYPE 2 DIABETES MELLITUS WITH OTHER SPECIFIED COMPLICATION, UNSPECIFIED WHETHER LONG TERM INSULIN USE (MULTI): ICD-10-CM

## 2023-10-11 RX ORDER — BLOOD SUGAR DIAGNOSTIC
1 STRIP MISCELLANEOUS 2 TIMES DAILY
Qty: 200 STRIP | Refills: 1 | Status: SHIPPED | OUTPATIENT
Start: 2023-10-11

## 2023-11-05 DIAGNOSIS — I10 ESSENTIAL (PRIMARY) HYPERTENSION: ICD-10-CM

## 2023-11-06 RX ORDER — LISINOPRIL 20 MG/1
20 TABLET ORAL DAILY
Qty: 30 TABLET | Refills: 0 | Status: SHIPPED | OUTPATIENT
Start: 2023-11-06 | End: 2024-01-29

## 2023-11-19 DIAGNOSIS — E78.5 HYPERLIPIDEMIA, UNSPECIFIED HYPERLIPIDEMIA TYPE: ICD-10-CM

## 2023-11-20 RX ORDER — LOVASTATIN 20 MG/1
20 TABLET ORAL
Qty: 90 TABLET | Refills: 1 | Status: SHIPPED | OUTPATIENT
Start: 2023-11-20 | End: 2024-05-23

## 2023-11-22 DIAGNOSIS — E11.69 TYPE 2 DIABETES MELLITUS WITH OTHER SPECIFIED COMPLICATION, UNSPECIFIED WHETHER LONG TERM INSULIN USE (MULTI): ICD-10-CM

## 2023-11-28 RX ORDER — GLIPIZIDE 5 MG/1
5 TABLET ORAL
Qty: 180 TABLET | Refills: 0 | Status: SHIPPED | OUTPATIENT
Start: 2023-11-28 | End: 2024-01-29

## 2023-11-30 ENCOUNTER — OFFICE VISIT (OUTPATIENT)
Dept: PRIMARY CARE | Facility: CLINIC | Age: 78
End: 2023-11-30
Payer: MEDICARE

## 2023-11-30 VITALS
BODY MASS INDEX: 24.69 KG/M2 | OXYGEN SATURATION: 93 % | TEMPERATURE: 96.5 F | DIASTOLIC BLOOD PRESSURE: 62 MMHG | SYSTOLIC BLOOD PRESSURE: 144 MMHG | HEART RATE: 95 BPM | WEIGHT: 126.4 LBS

## 2023-11-30 DIAGNOSIS — E78.5 HYPERLIPIDEMIA, UNSPECIFIED HYPERLIPIDEMIA TYPE: ICD-10-CM

## 2023-11-30 DIAGNOSIS — E78.00 HYPERCHOLESTEREMIA: ICD-10-CM

## 2023-11-30 DIAGNOSIS — D63.1 ANEMIA IN STAGE 3A CHRONIC KIDNEY DISEASE (MULTI): ICD-10-CM

## 2023-11-30 DIAGNOSIS — N18.31 ANEMIA IN STAGE 3A CHRONIC KIDNEY DISEASE (MULTI): ICD-10-CM

## 2023-11-30 DIAGNOSIS — I10 HYPERTENSION, UNSPECIFIED TYPE: ICD-10-CM

## 2023-11-30 DIAGNOSIS — E11.9 TYPE 2 DIABETES MELLITUS WITHOUT COMPLICATION, UNSPECIFIED WHETHER LONG TERM INSULIN USE (MULTI): Primary | ICD-10-CM

## 2023-11-30 DIAGNOSIS — I10 ESSENTIAL (PRIMARY) HYPERTENSION: ICD-10-CM

## 2023-11-30 LAB
POC FINGERSTICK BLOOD GLUCOSE: 64 MG/DL (ref 70–100)
POC HEMOGLOBIN A1C: 6.5 % (ref 4.2–6.5)

## 2023-11-30 PROCEDURE — 1125F AMNT PAIN NOTED PAIN PRSNT: CPT | Performed by: INTERNAL MEDICINE

## 2023-11-30 PROCEDURE — 1160F RVW MEDS BY RX/DR IN RCRD: CPT | Performed by: INTERNAL MEDICINE

## 2023-11-30 PROCEDURE — 82962 GLUCOSE BLOOD TEST: CPT | Performed by: INTERNAL MEDICINE

## 2023-11-30 PROCEDURE — 1159F MED LIST DOCD IN RCRD: CPT | Performed by: INTERNAL MEDICINE

## 2023-11-30 PROCEDURE — 83036 HEMOGLOBIN GLYCOSYLATED A1C: CPT | Performed by: INTERNAL MEDICINE

## 2023-11-30 PROCEDURE — 99214 OFFICE O/P EST MOD 30 MIN: CPT | Performed by: INTERNAL MEDICINE

## 2023-11-30 PROCEDURE — 1036F TOBACCO NON-USER: CPT | Performed by: INTERNAL MEDICINE

## 2023-11-30 PROCEDURE — 3077F SYST BP >= 140 MM HG: CPT | Performed by: INTERNAL MEDICINE

## 2023-11-30 PROCEDURE — 3078F DIAST BP <80 MM HG: CPT | Performed by: INTERNAL MEDICINE

## 2023-11-30 RX ORDER — ALLOPURINOL 100 MG/1
100 TABLET ORAL 3 TIMES DAILY
COMMUNITY
Start: 2023-09-16 | End: 2024-05-03

## 2023-11-30 RX ORDER — POTASSIUM CHLORIDE 1500 MG/1
20 TABLET, EXTENDED RELEASE ORAL DAILY
COMMUNITY
Start: 2023-10-29 | End: 2024-03-18 | Stop reason: SINTOL

## 2023-11-30 ASSESSMENT — ENCOUNTER SYMPTOMS
WHEEZING: 0
DIFFICULTY URINATING: 0
BLOOD IN STOOL: 0
HYPERTENSION: 1
HEADACHES: 0
ARTHRALGIAS: 0
UNEXPECTED WEIGHT CHANGE: 0
FATIGUE: 0
PALPITATIONS: 0
SINUS PAIN: 0
BRUISES/BLEEDS EASILY: 0
DIZZINESS: 0
SORE THROAT: 0
COUGH: 0
DIARRHEA: 0
FEVER: 0
ABDOMINAL PAIN: 0

## 2023-11-30 NOTE — PROGRESS NOTES
"Subjective   Patient ID: Hali Christianson is a 78 y.o. female who presents for Diabetes (A1C, random), Hypertension, Hyperlipidemia, Gout (Left foot), and Med Refill (Discuss potassium).    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 doing much better tolerating now  - Lumbosacral disease right-sided lumbar radiculitis patient did not benefit from epidural before we will give patient prescription for Medrol Dosepak follow-up results closely  - Iron deficiency anemia continue with iron supplement â€\" Vitamin D deficiency vitamin D very high need to change vitamin D 5000 to take it only once a week  -Chronic renal insufficiency continue with current medication refer patient to nephrology DR DEMARCO  -Chronic respiratory failure continues 2 L oxygen  - Peripheral vascular disease symptomatic follow-up and referral to vascular surgery today  - Lung nodule continue monitoring by pulmonary  - Chronic respiratory failure continues 2 L oxygen as recommended compensated,  - Follow-up 3 months      Diabetes  Pertinent negatives for hypoglycemia include no dizziness or headaches. Pertinent negatives for diabetes include no chest pain and no fatigue.   Hypertension  Pertinent negatives include no chest pain, headaches or palpitations.   Hyperlipidemia  Pertinent negatives include no chest pain.   Med Refill  Pertinent negatives include no abdominal pain, arthralgias, chest pain, congestion, coughing, fatigue, fever, headaches, rash or sore throat.          Review of Systems   Constitutional:  Negative for fatigue, fever and unexpected weight change.   HENT:  Negative for congestion, ear discharge, ear pain, mouth sores, sinus pain and sore throat.    Eyes:  Negative for visual disturbance.   Respiratory:  Negative for cough and wheezing.  "   Cardiovascular:  Negative for chest pain, palpitations and leg swelling.   Gastrointestinal:  Negative for abdominal pain, blood in stool and diarrhea.   Genitourinary:  Negative for difficulty urinating.   Musculoskeletal:  Negative for arthralgias.   Skin:  Negative for rash.   Neurological:  Negative for dizziness and headaches.   Hematological:  Does not bruise/bleed easily.   Psychiatric/Behavioral:  Negative for behavioral problems.    All other systems reviewed and are negative.      Objective   Lab Results   Component Value Date    HGBA1C 6.5 11/30/2023      /62   Pulse 95   Temp 35.8 °C (96.5 °F)   Wt 57.3 kg (126 lb 6.4 oz)   SpO2 93%   BMI 24.69 kg/m²   Lab Results   Component Value Date    WBC 7.8 09/27/2023    HGB 14.9 09/27/2023    HCT 47.2 (H) 09/27/2023     09/27/2023    CHOL 154 08/16/2023    TRIG 169 (H) 08/16/2023    HDL 49.9 08/16/2023    ALT 15 08/16/2023    AST 17 08/16/2023     09/27/2023    K 3.1 (L) 09/27/2023     09/27/2023    CREATININE 0.89 09/27/2023    BUN 15 09/27/2023    CO2 26 09/27/2023    TSH 1.89 08/16/2023    HGBA1C 6.5 11/30/2023     par   Physical Exam  Vitals and nursing note reviewed.   Constitutional:       Appearance: Normal appearance.   HENT:      Head: Normocephalic.      Nose: Nose normal.   Eyes:      Conjunctiva/sclera: Conjunctivae normal.      Pupils: Pupils are equal, round, and reactive to light.   Cardiovascular:      Rate and Rhythm: Regular rhythm.   Pulmonary:      Effort: Pulmonary effort is normal.      Breath sounds: Normal breath sounds.   Abdominal:      General: Abdomen is flat.      Palpations: Abdomen is soft.   Musculoskeletal:      Cervical back: Neck supple.   Skin:     General: Skin is warm.   Neurological:      General: No focal deficit present.      Mental Status: She is oriented to person, place, and time.   Psychiatric:         Mood and Affect: Mood normal.         Assessment/Plan   Hali was seen today for  "diabetes, hypertension, hyperlipidemia, gout and med refill.  Diagnoses and all orders for this visit:  Type 2 diabetes mellitus without complication, unspecified whether long term insulin use (CMS/[facility]) (Primary)  -     POCT fingerstick glucose manually resulted  -     POCT glycosylated hemoglobin (Hb A1C) manually resulted  Essential (primary) hypertension  Hyperlipidemia, unspecified hyperlipidemia type  Hypertension, unspecified type  Hypercholesteremia  Anemia in stage 3a chronic kidney disease (CMS/[facility])  Other orders  -     Follow Up In Primary Care - Established   -Diabetes controlled improving hemoglobin A1c 6.5 continue low-carb diet  Chronic gout patient seen by DR DEMARCO on allopurinol allopurinol again doing much better  - 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 capsules doing much better tolerating now  - Lumbosacral disease right-sided lumbar radiculitis patient did not benefit from epidural before we will give patient prescription for Medrol Dosepak follow-up results closely  - Iron deficiency anemia continue with iron supplement â€\" Vitamin D deficiency vitamin D very high need to change vitamin D 5000 to take it only once a week  -Chronic renal insufficiency continue with current medication refer patient to nephrology DR DEMARCO  -Chronic respiratory failure continues 2 L oxygen  - Peripheral vascular disease symptomatic follow-up and referral to vascular surgery today  - Lung nodule continue monitoring by pulmonary  - Chronic respiratory failure continues 2 L oxygen as recommended compensated,  - Follow-up 3 months    "

## 2023-12-27 ENCOUNTER — OFFICE VISIT (OUTPATIENT)
Dept: PULMONOLOGY | Facility: CLINIC | Age: 78
End: 2023-12-27
Payer: MEDICARE

## 2023-12-27 VITALS
BODY MASS INDEX: 24.1 KG/M2 | OXYGEN SATURATION: 92 % | WEIGHT: 123.4 LBS | DIASTOLIC BLOOD PRESSURE: 55 MMHG | HEART RATE: 93 BPM | SYSTOLIC BLOOD PRESSURE: 137 MMHG

## 2023-12-27 DIAGNOSIS — J43.2 CENTRILOBULAR EMPHYSEMA (MULTI): Primary | ICD-10-CM

## 2023-12-27 PROCEDURE — 1159F MED LIST DOCD IN RCRD: CPT | Performed by: NURSE PRACTITIONER

## 2023-12-27 PROCEDURE — 1160F RVW MEDS BY RX/DR IN RCRD: CPT | Performed by: NURSE PRACTITIONER

## 2023-12-27 PROCEDURE — 3075F SYST BP GE 130 - 139MM HG: CPT | Performed by: NURSE PRACTITIONER

## 2023-12-27 PROCEDURE — 1036F TOBACCO NON-USER: CPT | Performed by: NURSE PRACTITIONER

## 2023-12-27 PROCEDURE — 1125F AMNT PAIN NOTED PAIN PRSNT: CPT | Performed by: NURSE PRACTITIONER

## 2023-12-27 PROCEDURE — 99214 OFFICE O/P EST MOD 30 MIN: CPT | Performed by: NURSE PRACTITIONER

## 2023-12-27 PROCEDURE — 3078F DIAST BP <80 MM HG: CPT | Performed by: NURSE PRACTITIONER

## 2023-12-27 RX ORDER — EPINEPHRINE 0.3 MG/.3ML
1 INJECTION SUBCUTANEOUS ONCE AS NEEDED
Qty: 2 EACH | Refills: 3 | Status: SHIPPED | OUTPATIENT
Start: 2023-12-27

## 2023-12-27 RX ORDER — ALBUTEROL SULFATE 0.83 MG/ML
2.5 SOLUTION RESPIRATORY (INHALATION) EVERY 4 HOURS PRN
Qty: 180 ML | Refills: 1 | Status: SHIPPED | OUTPATIENT
Start: 2023-12-27

## 2023-12-27 RX ORDER — ALBUTEROL SULFATE 90 UG/1
2 AEROSOL, METERED RESPIRATORY (INHALATION) EVERY 4 HOURS PRN
Qty: 54 G | Refills: 1 | Status: SHIPPED | OUTPATIENT
Start: 2023-12-27

## 2023-12-27 RX ORDER — BUDESONIDE, GLYCOPYRROLATE, AND FORMOTEROL FUMARATE 160; 9; 4.8 UG/1; UG/1; UG/1
2 AEROSOL, METERED RESPIRATORY (INHALATION) 2 TIMES DAILY
Qty: 30 G | Refills: 1 | Status: SHIPPED | OUTPATIENT
Start: 2023-12-27

## 2023-12-27 NOTE — PROGRESS NOTES
Subjective   Patient ID: Hali Christianson is a 78 y.o. female who presents for No chief complaint on file..  HPI  New patient here today to establish care. Patient recently moved from [place] to stay with her daughter here. While in [place] she was seeing a pulmonologist. She gave us the name of the pulmonologist so we can contact his office for records. She is doing well other than when she uses the Trelegy she gags and will vomit. I really think it is the powdered inhaler. She says she tries to rinse and everything but is not working for her she was fine with Symbicort so I would like to try Breztri. She continues to use 3 L of oxygen she said she started oxygen about 8 or 9 months ago during the hospital stay. She tells me she had a PFT for 5 months ago with her doctor in [place]. We will call to get results. She did have a chest CT which did show some small nodules the largest being 6 mm. Also showed emphysematous changes and some bullae. Patient purchased her own [employer] daily concentrator. I would like her to have a nebulizer with albuterol for home use    03/09/23 she is here today for follow-up. She has been doing well. She does like the Breztri and she has to use her rescue inhaler only on occasion. She continues on 3 L of oxygen. We talked about measuring her oxygen at home but she cannot afford a pulse oximeter. Going to order one through the pharmacy and see if it is covered. She she complains that she does have rhinitis I will send in a nasal spray for her. She will be due for a new chest CT in October 12/27/23 she is here today for follow-up.  She has been doing well.  She continues to use Breztri daily uses her albuterol twice a week.  She has had no visits to the ER for respiratory.  Her only visit was for bee sting.  She uses 3 L of oxygen continuously.  She did take her O2 off she walked to the scale to get weighed back to the exam room and she was only 82%.  She put her 3 L of O2 back on and  osorio to 93%.          Previous pulmonary history:   She has no history of recurrent infections, or lung disease as a child. She was previously told she has copd . She currently is on supplemental oxygen. She has never been to pulmonary rehab. Does not recall having AECOPD requiring antibiotics or prednisone.     Inhalers/nebulized medications: Trelegy and albuterol      Hospitalization History:  She has not been hospitalized over the last year for breathing related problem.     Sleep history:  Denies snoring, apneas, feeling tired during the day or taking naps during the day..  Does not take frequent naps. Does not feel tired during the day or take frequent naps.  STOP-BANG score of      Comorbidities:  Hypertension  Diabetes  Diabetic neuropathy    SH:  smoking:former   drinking: none  illicit drug use: none     Occupation: (Full questionnaire on exposures obtained, discussed with the patient and scanned to EMR)  worked as   No known exposure to asbestos, silica or beryllium     Family History:  No family history of lung diseases or cancer     Imaging history: (I have personally reviewed the imaging below)     PFTs:  // -> FEV1/FVC ratio/FEV1 (no BD response)/FVC/DLCO /TLC/RV to TLC ratio     6 MWTs: None on record     Lung biopsy: None on record     Echo: None on record  -> Normal EF, diastolic dysfunction, with normal LA, RV size and function     Review of Systems   All other systems reviewed and are negative.      Objective   Physical Exam  Vitals and nursing note reviewed.   Constitutional:       Appearance: Normal appearance.   HENT:      Head: Normocephalic.      Nose: Nose normal.   Eyes:      Pupils: Pupils are equal, round, and reactive to light.   Cardiovascular:      Rate and Rhythm: Normal rate.   Pulmonary:      Effort: Pulmonary effort is normal.      Breath sounds: Normal breath sounds.   Neurological:      General: No focal deficit present.      Mental Status: She is alert and oriented to person,  place, and time. Mental status is at baseline.   Psychiatric:         Mood and Affect: Mood normal.         Behavior: Behavior normal.         Thought Content: Thought content normal.         Judgment: Judgment normal.         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 Idealy, inhaler regimen should include LABA, LAMA, ICS and prn JEYSON.   -Recurrent AECOPD in patient with chronic bronchitis and FEV1<50% Roflimulast,   -Recurrent AECOPD in a former smoker, will start macrolide therapy (azithromycin 250mg three times per week).  . A FAUSTINA score of (0-2, 3-4, 5-6, 7-10) conveys a 4-year predicted survival of 90%, 80%, 70%, 20% or less).  -Advanced therapies including LVRS and Lung transplantation.  -Counseled on the role of diet and exercise  -Vaccinations: Yearly influenza vaccines, Pneumoccocal (both PCV13 and PPSV23 for all patients 65 y.o or above)  -Depression score.  -Sleep evaluation as below.  -Echo to evaluate for core pulmonale.  -Does not need oxygen at rest. Oxygen need evaluation with walking and sleep (nighttime oximetry)   - She tells me the Trelegy makes her gag and then she vomits. I will change her to Breztri   -Would also like her to have a nebulizer with albuterol for home use  -She had a PFT 5 months ago in [place] we will call to obtain records - records have not yet been received   - 03/09/23 she continues on Breztri with good response and uses albuterol as needed  12/27/23 she uses her  Breztri and uses albuterol 2x a week. She has been feeling well. Needs refills on her meds      #Chronic respiratory failure   -Patient uses 3 liters of oxygen continuously   - She can benefit from a pulse oximeter at home so she can monitor her oxygen  12/27/23 She uses 3L and was only 82 % when she took her 02 off to walk to get weighed. When she put her 02 back on she osorio to 93%     # Lung nodule   - 6mm RML lung nodule   -High risk since she was a smoker   -We will repeat in 1 year 10/23   12/27/23 she had a chest Ct in September. Showing a 6 mm nodule unchanged from 9/2022      Mrs. Christianson it was a pleasure seeing you in the office today. We discussed the following:      - Please continue your Breztri this is 2 puffs twice a day   - Please use your albuterol as needed   - Use your nebulizer with albuterol as needed    - Please continue your oxygen keeping it above 90%      Please follow-up in 6 months     NOEMÍ Sherman-CNP 12/27/23 8:18 AM

## 2023-12-27 NOTE — PATIENT INSTRUCTIONS
Mrs. Christianson it was a pleasure seeing you in the office today. We discussed the following:      - Please continue your Breztri this is 2 puffs twice a day   - Please use your albuterol as needed   - Use your nebulizer with albuterol as needed    - Please continue your oxygen keeping it above 90%      Please follow-up in 6 months

## 2024-01-10 DIAGNOSIS — E11.69 TYPE 2 DIABETES MELLITUS WITH OTHER SPECIFIED COMPLICATION, UNSPECIFIED WHETHER LONG TERM INSULIN USE (MULTI): ICD-10-CM

## 2024-01-11 RX ORDER — METFORMIN HYDROCHLORIDE 500 MG/1
500 TABLET, EXTENDED RELEASE ORAL EVERY 12 HOURS
Qty: 180 TABLET | Refills: 1 | Status: SHIPPED | OUTPATIENT
Start: 2024-01-11

## 2024-01-29 DIAGNOSIS — D63.1 ANEMIA IN STAGE 3A CHRONIC KIDNEY DISEASE (MULTI): ICD-10-CM

## 2024-01-29 DIAGNOSIS — I10 ESSENTIAL (PRIMARY) HYPERTENSION: ICD-10-CM

## 2024-01-29 DIAGNOSIS — I10 BENIGN ESSENTIAL HYPERTENSION: ICD-10-CM

## 2024-01-29 DIAGNOSIS — N18.31 ANEMIA IN STAGE 3A CHRONIC KIDNEY DISEASE (MULTI): ICD-10-CM

## 2024-01-29 DIAGNOSIS — E11.69 TYPE 2 DIABETES MELLITUS WITH OTHER SPECIFIED COMPLICATION, UNSPECIFIED WHETHER LONG TERM INSULIN USE (MULTI): ICD-10-CM

## 2024-01-29 RX ORDER — FERROUS GLUCONATE 324(38)MG
TABLET ORAL
Qty: 90 TABLET | Refills: 1 | Status: SHIPPED | OUTPATIENT
Start: 2024-01-29

## 2024-01-29 RX ORDER — LISINOPRIL 20 MG/1
20 TABLET ORAL DAILY
Qty: 90 TABLET | Refills: 1 | Status: SHIPPED | OUTPATIENT
Start: 2024-01-29

## 2024-01-29 RX ORDER — GLIPIZIDE 5 MG/1
5 TABLET ORAL
Qty: 180 TABLET | Refills: 1 | Status: SHIPPED | OUTPATIENT
Start: 2024-01-29

## 2024-01-29 RX ORDER — FUROSEMIDE 20 MG/1
20 TABLET ORAL DAILY
Qty: 90 TABLET | Refills: 1 | Status: SHIPPED | OUTPATIENT
Start: 2024-01-29

## 2024-03-18 ENCOUNTER — OFFICE VISIT (OUTPATIENT)
Dept: PRIMARY CARE | Facility: CLINIC | Age: 79
End: 2024-03-18
Payer: MEDICARE

## 2024-03-18 VITALS
SYSTOLIC BLOOD PRESSURE: 126 MMHG | DIASTOLIC BLOOD PRESSURE: 72 MMHG | BODY MASS INDEX: 24.77 KG/M2 | HEIGHT: 60 IN | OXYGEN SATURATION: 87 % | TEMPERATURE: 97.1 F | HEART RATE: 75 BPM | WEIGHT: 126.2 LBS

## 2024-03-18 DIAGNOSIS — E11.69 TYPE 2 DIABETES MELLITUS WITH OTHER SPECIFIED COMPLICATION, UNSPECIFIED WHETHER LONG TERM INSULIN USE (MULTI): ICD-10-CM

## 2024-03-18 DIAGNOSIS — I50.9 HEART FAILURE, UNSPECIFIED HF CHRONICITY, UNSPECIFIED HEART FAILURE TYPE (MULTI): ICD-10-CM

## 2024-03-18 DIAGNOSIS — Z79.899 HIGH RISK MEDICATION USE: ICD-10-CM

## 2024-03-18 DIAGNOSIS — Z00.00 ROUTINE GENERAL MEDICAL EXAMINATION AT HEALTH CARE FACILITY: Primary | ICD-10-CM

## 2024-03-18 DIAGNOSIS — J43.2 CENTRILOBULAR EMPHYSEMA (MULTI): ICD-10-CM

## 2024-03-18 DIAGNOSIS — I73.9 PVD (PERIPHERAL VASCULAR DISEASE) (CMS-HCC): ICD-10-CM

## 2024-03-18 DIAGNOSIS — E78.00 HYPERCHOLESTEREMIA: ICD-10-CM

## 2024-03-18 DIAGNOSIS — Z12.31 ENCOUNTER FOR SCREENING MAMMOGRAM FOR MALIGNANT NEOPLASM OF BREAST: ICD-10-CM

## 2024-03-18 DIAGNOSIS — E53.8 VITAMIN B12 DEFICIENCY: ICD-10-CM

## 2024-03-18 DIAGNOSIS — J96.91 RESPIRATORY FAILURE WITH HYPOXIA, UNSPECIFIED CHRONICITY (MULTI): ICD-10-CM

## 2024-03-18 DIAGNOSIS — D50.9 IRON DEFICIENCY ANEMIA, UNSPECIFIED IRON DEFICIENCY ANEMIA TYPE: ICD-10-CM

## 2024-03-18 DIAGNOSIS — E87.6 HYPOKALEMIA: ICD-10-CM

## 2024-03-18 DIAGNOSIS — Z13.89 SCREENING FOR MULTIPLE CONDITIONS: ICD-10-CM

## 2024-03-18 DIAGNOSIS — M06.09 RHEUMATOID ARTHRITIS OF MULTIPLE SITES WITH NEGATIVE RHEUMATOID FACTOR (MULTI): ICD-10-CM

## 2024-03-18 DIAGNOSIS — E55.9 VITAMIN D DEFICIENCY, UNSPECIFIED: ICD-10-CM

## 2024-03-18 DIAGNOSIS — N18.31 ANEMIA IN STAGE 3A CHRONIC KIDNEY DISEASE (MULTI): ICD-10-CM

## 2024-03-18 DIAGNOSIS — R53.83 OTHER FATIGUE: ICD-10-CM

## 2024-03-18 DIAGNOSIS — D63.1 ANEMIA IN STAGE 3A CHRONIC KIDNEY DISEASE (MULTI): ICD-10-CM

## 2024-03-18 DIAGNOSIS — I10 HYPERTENSION, UNSPECIFIED TYPE: ICD-10-CM

## 2024-03-18 PROCEDURE — G0439 PPPS, SUBSEQ VISIT: HCPCS | Performed by: INTERNAL MEDICINE

## 2024-03-18 PROCEDURE — 3074F SYST BP LT 130 MM HG: CPT | Performed by: INTERNAL MEDICINE

## 2024-03-18 PROCEDURE — 1159F MED LIST DOCD IN RCRD: CPT | Performed by: INTERNAL MEDICINE

## 2024-03-18 PROCEDURE — 1160F RVW MEDS BY RX/DR IN RCRD: CPT | Performed by: INTERNAL MEDICINE

## 2024-03-18 PROCEDURE — 1036F TOBACCO NON-USER: CPT | Performed by: INTERNAL MEDICINE

## 2024-03-18 PROCEDURE — 1158F ADVNC CARE PLAN TLK DOCD: CPT | Performed by: INTERNAL MEDICINE

## 2024-03-18 PROCEDURE — 1170F FXNL STATUS ASSESSED: CPT | Performed by: INTERNAL MEDICINE

## 2024-03-18 PROCEDURE — 99214 OFFICE O/P EST MOD 30 MIN: CPT | Performed by: INTERNAL MEDICINE

## 2024-03-18 PROCEDURE — 99397 PER PM REEVAL EST PAT 65+ YR: CPT | Performed by: INTERNAL MEDICINE

## 2024-03-18 PROCEDURE — 1123F ACP DISCUSS/DSCN MKR DOCD: CPT | Performed by: INTERNAL MEDICINE

## 2024-03-18 PROCEDURE — 3078F DIAST BP <80 MM HG: CPT | Performed by: INTERNAL MEDICINE

## 2024-03-18 RX ORDER — POTASSIUM CHLORIDE 750 MG/1
10 TABLET, FILM COATED, EXTENDED RELEASE ORAL 2 TIMES DAILY
Qty: 60 TABLET | Refills: 11 | Status: SHIPPED | OUTPATIENT
Start: 2024-03-18 | End: 2025-03-18

## 2024-03-18 RX ORDER — FERROUS SULFATE 325(65) MG
325 TABLET, DELAYED RELEASE (ENTERIC COATED) ORAL
Qty: 90 TABLET | Refills: 3 | Status: SHIPPED | OUTPATIENT
Start: 2024-03-18 | End: 2025-03-18

## 2024-03-18 ASSESSMENT — ENCOUNTER SYMPTOMS
BRUISES/BLEEDS EASILY: 0
DIZZINESS: 0
HEADACHES: 0
BLOOD IN STOOL: 0
SORE THROAT: 0
FATIGUE: 0
WHEEZING: 0
DIFFICULTY URINATING: 0
UNEXPECTED WEIGHT CHANGE: 0
FEVER: 0
PALPITATIONS: 0
ARTHRALGIAS: 0
COUGH: 0
ABDOMINAL PAIN: 0
SINUS PAIN: 0
DIARRHEA: 0

## 2024-03-18 ASSESSMENT — ACTIVITIES OF DAILY LIVING (ADL)
DRESSING: INDEPENDENT
BATHING: INDEPENDENT
DOING_HOUSEWORK: INDEPENDENT
TAKING_MEDICATION: INDEPENDENT
MANAGING_FINANCES: INDEPENDENT
GROCERY_SHOPPING: INDEPENDENT

## 2024-03-18 ASSESSMENT — PATIENT HEALTH QUESTIONNAIRE - PHQ9
SUM OF ALL RESPONSES TO PHQ9 QUESTIONS 1 AND 2: 0
1. LITTLE INTEREST OR PLEASURE IN DOING THINGS: NOT AT ALL
2. FEELING DOWN, DEPRESSED OR HOPELESS: NOT AT ALL

## 2024-03-18 NOTE — PROGRESS NOTES
Subjective   Reason for Visit: Hali Christianson is an 79 y.o. female here for a Medicare Wellness visit.     Past Medical, Surgical, and Family History reviewed and updated in chart.    Reviewed all medications by prescribing practitioner or clinical pharmacist (such as prescriptions, OTCs, herbal therapies and supplements) and documented in the medical record.    Annual preventive visit  - Vaccinations reviewed and up-to-date needs booster for tetanus vaccine  -Needs a screening mammogram  - Schedule colonoscopy obtained no need to repeat because of patient age  -Recent blood work reviewed  - Screening for depression negative  - Advanced directive reviewed    Follow-up  -Anemia compensated need to continue with iron 324 mg daily  -Hypokalemia patient unable to swallow current potassium tablet switch patient to potassium ER 10 mg twice a day new prescription provided  -Diabetes controlled improving hemoglobin A1c 6.5 continue low-carb diet  -Chronic gout patient seen by DR DEMARCO on allopurinol allopurinol again doing much better  -Chronic renal insufficiency continue with current medication refer patient to nephrology DR DEMARCO  -Chronic respiratory failure continues 2 L oxygen  - Peripheral vascular disease symptomatic follow-up and referral to vascular surgery today  - Lung nodule continue monitoring by pulmonary  -Follow-up 3 months          Patient Care Team:  Alvin Davis MD as PCP - General  Alvin Davis MD as PCP - [employer] PCP     Review of Systems   Constitutional:  Negative for fatigue, fever and unexpected weight change.   HENT:  Negative for congestion, ear discharge, ear pain, mouth sores, sinus pain and sore throat.    Eyes:  Negative for visual disturbance.   Respiratory:  Negative for cough and wheezing.    Cardiovascular:  Negative for chest pain, palpitations and leg swelling.   Gastrointestinal:  Negative for abdominal pain, blood in stool and diarrhea.    Genitourinary:  Negative for difficulty urinating.   Musculoskeletal:  Negative for arthralgias.   Skin:  Negative for rash.   Neurological:  Negative for dizziness and headaches.   Hematological:  Does not bruise/bleed easily.   Psychiatric/Behavioral:  Negative for behavioral problems.    All other systems reviewed and are negative.      Objective   Vitals:  /72   Pulse 75   Temp 36.2 °C (97.1 °F)   Ht 1.524 m (5')   Wt 57.2 kg (126 lb 3.2 oz)   SpO2 (!) 87%   BMI 24.65 kg/m²       Physical Exam  Vitals and nursing note reviewed.   Constitutional:       Appearance: Normal appearance.   HENT:      Head: Normocephalic.      Nose: Nose normal.   Eyes:      Conjunctiva/sclera: Conjunctivae normal.      Pupils: Pupils are equal, round, and reactive to light.   Cardiovascular:      Rate and Rhythm: Regular rhythm.   Pulmonary:      Effort: Pulmonary effort is normal.      Breath sounds: Normal breath sounds.   Abdominal:      General: Abdomen is flat.      Palpations: Abdomen is soft.   Musculoskeletal:      Cervical back: Neck supple.   Skin:     General: Skin is warm.   Neurological:      General: No focal deficit present.      Mental Status: She is oriented to person, place, and time.   Psychiatric:         Mood and Affect: Mood normal.         Assessment/Plan   Problem List Items Addressed This Visit       COPD (chronic obstructive pulmonary disease) (CMS/HCC)    Anemia in stage 3a chronic kidney disease (CMS/HCC)    Type 2 diabetes mellitus (CMS/HCC)    Rheumatoid arthritis of multiple sites with negative rheumatoid factor (CMS/HCC)    PVD (peripheral vascular disease) (CMS/HCC)    Respiratory failure with hypoxia, unspecified chronicity (CMS/HCC)    Heart failure, unspecified HF chronicity, unspecified heart failure type (CMS/HCC)     Other Visit Diagnoses       Routine general medical examination at health care facility    -  Primary    Screening for multiple conditions        Encounter for  screening mammogram for malignant neoplasm of breast        Relevant Orders    BI mammo bilateral screening tomosynthesis    High risk medication use        Relevant Orders    CBC and Auto Differential    Hypertension, unspecified type        Relevant Orders    Comprehensive Metabolic Panel    Hypercholesteremia        Relevant Orders    Lipid Panel    Other fatigue        Relevant Orders    TSH with reflex to Free T4 if abnormal    Vitamin B12 deficiency        Relevant Orders    Vitamin B12    Vitamin D deficiency, unspecified        Relevant Orders    Vitamin D 25-Hydroxy,Total (for eval of Vitamin D levels)    Iron deficiency anemia, unspecified iron deficiency anemia type        Relevant Medications    ferrous sulfate 325 (65 Fe) MG EC tablet    Hypokalemia        Relevant Medications    potassium chloride CR (Klor-Con) 10 mEq ER tablet        Annual preventive visit  - Vaccinations reviewed and up-to-date needs booster for tetanus vaccine  -Needs a screening mammogram  - Schedule colonoscopy obtained no need to repeat because of patient age  -Recent blood work reviewed  - Screening for depression negative  - Advanced directive reviewed    Follow-up  -Anemia compensated need to continue with iron 324 mg daily  -Hypokalemia patient unable to swallow current potassium tablet switch patient to potassium ER 10 mg twice a day new prescription provided  -Diabetes controlled improving hemoglobin A1c 6.5 continue low-carb diet  -Chronic gout patient seen by DR DEMARCO on allopurinol allopurinol again doing much better  -Chronic renal insufficiency continue with current medication refer patient to nephrology DR DEMARCO  -Chronic respiratory failure continues 2 L oxygen  - Peripheral vascular disease symptomatic follow-up and referral to vascular surgery today  - Lung nodule continue monitoring by pulmonary  -Follow-up 3 months

## 2024-03-18 NOTE — PROGRESS NOTES
Subjective   Patient ID: Hali Christianson is a 79 y.o. female who presents for Medicare Annual Wellness Visit Subsequent, Gout (Gout Flare), and Heart Problem (Hears heartbeat and hurts at times, cardiology said everything was ok).    HPI       Review of Systems    Objective   Lab Results   Component Value Date    HGBA1C 6.5 11/30/2023      /72   Pulse 75   Temp 36.2 °C (97.1 °F)   Ht 1.524 m (5')   Wt 57.2 kg (126 lb 3.2 oz)   SpO2 (!) 87%   BMI 24.65 kg/m²   Lab Results   Component Value Date    WBC 7.8 09/27/2023    HGB 14.9 09/27/2023    HCT 47.2 (H) 09/27/2023     09/27/2023    CHOL 154 08/16/2023    TRIG 169 (H) 08/16/2023    HDL 49.9 08/16/2023    ALT 15 08/16/2023    AST 17 08/16/2023     09/27/2023    K 3.1 (L) 09/27/2023     09/27/2023    CREATININE 0.89 09/27/2023    BUN 15 09/27/2023    CO2 26 09/27/2023    TSH 1.89 08/16/2023    HGBA1C 6.5 11/30/2023     par   Physical Exam    Assessment/Plan   There are no diagnoses linked to this encounter.

## 2024-05-02 DIAGNOSIS — M10.9 GOUT, UNSPECIFIED: ICD-10-CM

## 2024-05-03 RX ORDER — ALLOPURINOL 100 MG/1
100 TABLET ORAL 3 TIMES DAILY
Qty: 270 TABLET | Refills: 2 | Status: SHIPPED | OUTPATIENT
Start: 2024-05-03

## 2024-05-23 DIAGNOSIS — E78.5 HYPERLIPIDEMIA, UNSPECIFIED HYPERLIPIDEMIA TYPE: ICD-10-CM

## 2024-05-23 RX ORDER — LOVASTATIN 20 MG/1
20 TABLET ORAL
Qty: 90 TABLET | Refills: 1 | Status: SHIPPED | OUTPATIENT
Start: 2024-05-23

## 2024-05-30 ENCOUNTER — LAB (OUTPATIENT)
Dept: LAB | Facility: LAB | Age: 79
End: 2024-05-30
Payer: MEDICARE

## 2024-05-30 DIAGNOSIS — E55.9 VITAMIN D DEFICIENCY, UNSPECIFIED: ICD-10-CM

## 2024-05-30 DIAGNOSIS — Z79.899 HIGH RISK MEDICATION USE: ICD-10-CM

## 2024-05-30 DIAGNOSIS — E53.8 VITAMIN B12 DEFICIENCY: ICD-10-CM

## 2024-05-30 DIAGNOSIS — R53.83 OTHER FATIGUE: ICD-10-CM

## 2024-05-30 DIAGNOSIS — I10 HYPERTENSION, UNSPECIFIED TYPE: ICD-10-CM

## 2024-05-30 DIAGNOSIS — E78.00 HYPERCHOLESTEREMIA: ICD-10-CM

## 2024-05-30 LAB
ALBUMIN SERPL BCP-MCNC: 4.4 G/DL (ref 3.4–5)
ALP SERPL-CCNC: 53 U/L (ref 33–136)
ALT SERPL W P-5'-P-CCNC: 17 U/L (ref 7–45)
ANION GAP SERPL CALC-SCNC: 15 MMOL/L (ref 10–20)
AST SERPL W P-5'-P-CCNC: 24 U/L (ref 9–39)
BILIRUB SERPL-MCNC: 0.4 MG/DL (ref 0–1.2)
BUN SERPL-MCNC: 17 MG/DL (ref 6–23)
CALCIUM SERPL-MCNC: 9.8 MG/DL (ref 8.6–10.3)
CHLORIDE SERPL-SCNC: 106 MMOL/L (ref 98–107)
CHOLEST SERPL-MCNC: 160 MG/DL (ref 0–199)
CHOLESTEROL/HDL RATIO: 3.3
CO2 SERPL-SCNC: 24 MMOL/L (ref 21–32)
CREAT SERPL-MCNC: 0.97 MG/DL (ref 0.5–1.05)
EGFRCR SERPLBLD CKD-EPI 2021: 59 ML/MIN/1.73M*2
GLUCOSE SERPL-MCNC: 111 MG/DL (ref 74–99)
HDLC SERPL-MCNC: 48.9 MG/DL
LDLC SERPL CALC-MCNC: 86 MG/DL
NON HDL CHOLESTEROL: 111 MG/DL (ref 0–149)
POTASSIUM SERPL-SCNC: 4.1 MMOL/L (ref 3.5–5.3)
PROT SERPL-MCNC: 6.8 G/DL (ref 6.4–8.2)
SODIUM SERPL-SCNC: 141 MMOL/L (ref 136–145)
TRIGL SERPL-MCNC: 125 MG/DL (ref 0–149)
TSH SERPL-ACNC: 1.44 MIU/L (ref 0.44–3.98)
VLDL: 25 MG/DL (ref 0–40)

## 2024-05-30 PROCEDURE — 80053 COMPREHEN METABOLIC PANEL: CPT

## 2024-05-30 PROCEDURE — 82607 VITAMIN B-12: CPT

## 2024-05-30 PROCEDURE — 80061 LIPID PANEL: CPT

## 2024-05-30 PROCEDURE — 82306 VITAMIN D 25 HYDROXY: CPT

## 2024-05-30 PROCEDURE — 84443 ASSAY THYROID STIM HORMONE: CPT

## 2024-05-30 PROCEDURE — 36415 COLL VENOUS BLD VENIPUNCTURE: CPT

## 2024-05-31 LAB
25(OH)D3 SERPL-MCNC: 45 NG/ML (ref 30–100)
VIT B12 SERPL-MCNC: 374 PG/ML (ref 211–911)

## 2024-06-20 ENCOUNTER — TELEPHONE (OUTPATIENT)
Dept: PRIMARY CARE | Facility: CLINIC | Age: 79
End: 2024-06-20
Payer: MEDICARE

## 2024-06-20 DIAGNOSIS — I10 HYPERTENSION, UNSPECIFIED TYPE: ICD-10-CM

## 2024-06-20 RX ORDER — LOSARTAN POTASSIUM 50 MG/1
50 TABLET ORAL DAILY
Qty: 90 TABLET | Refills: 0 | Status: SHIPPED | OUTPATIENT
Start: 2024-06-20

## 2024-06-20 RX ORDER — LOSARTAN POTASSIUM 50 MG/1
50 TABLET ORAL DAILY
COMMUNITY
End: 2024-06-20 | Stop reason: SDUPTHER

## 2024-06-20 NOTE — TELEPHONE ENCOUNTER
Patient is complaining of cough due to lisinopril. She quit taking and is asking for different bp medication.

## 2024-06-26 ENCOUNTER — APPOINTMENT (OUTPATIENT)
Dept: PULMONOLOGY | Facility: CLINIC | Age: 79
End: 2024-06-26
Payer: MEDICARE

## 2024-06-27 ENCOUNTER — APPOINTMENT (OUTPATIENT)
Dept: PULMONOLOGY | Facility: CLINIC | Age: 79
End: 2024-06-27
Payer: MEDICARE

## 2024-07-01 ENCOUNTER — LAB (OUTPATIENT)
Dept: LAB | Facility: LAB | Age: 79
End: 2024-07-01
Payer: MEDICARE

## 2024-07-01 ENCOUNTER — APPOINTMENT (OUTPATIENT)
Dept: PULMONOLOGY | Facility: CLINIC | Age: 79
End: 2024-07-01
Payer: MEDICARE

## 2024-07-01 VITALS
BODY MASS INDEX: 24.92 KG/M2 | WEIGHT: 127.6 LBS | DIASTOLIC BLOOD PRESSURE: 71 MMHG | HEART RATE: 74 BPM | SYSTOLIC BLOOD PRESSURE: 165 MMHG

## 2024-07-01 DIAGNOSIS — Z79.899 OTHER LONG TERM (CURRENT) DRUG THERAPY: Primary | ICD-10-CM

## 2024-07-01 DIAGNOSIS — J96.11 CHRONIC HYPOXEMIC RESPIRATORY FAILURE (MULTI): ICD-10-CM

## 2024-07-01 DIAGNOSIS — J43.2 CENTRILOBULAR EMPHYSEMA (MULTI): ICD-10-CM

## 2024-07-01 DIAGNOSIS — J96.91 RESPIRATORY FAILURE WITH HYPOXIA, UNSPECIFIED CHRONICITY (MULTI): Primary | ICD-10-CM

## 2024-07-01 LAB
BASOPHILS # BLD AUTO: 0.06 X10*3/UL (ref 0–0.1)
BASOPHILS NFR BLD AUTO: 0.8 %
EOSINOPHIL # BLD AUTO: 0.18 X10*3/UL (ref 0–0.4)
EOSINOPHIL NFR BLD AUTO: 2.4 %
ERYTHROCYTE [DISTWIDTH] IN BLOOD BY AUTOMATED COUNT: 13.4 % (ref 11.5–14.5)
HCT VFR BLD AUTO: 48.9 % (ref 36–46)
HGB BLD-MCNC: 15.7 G/DL (ref 12–16)
IMM GRANULOCYTES # BLD AUTO: 0.02 X10*3/UL (ref 0–0.5)
IMM GRANULOCYTES NFR BLD AUTO: 0.3 % (ref 0–0.9)
LYMPHOCYTES # BLD AUTO: 1.58 X10*3/UL (ref 0.8–3)
LYMPHOCYTES NFR BLD AUTO: 20.8 %
MCH RBC QN AUTO: 29.3 PG (ref 26–34)
MCHC RBC AUTO-ENTMCNC: 32.1 G/DL (ref 32–36)
MCV RBC AUTO: 91 FL (ref 80–100)
MONOCYTES # BLD AUTO: 0.69 X10*3/UL (ref 0.05–0.8)
MONOCYTES NFR BLD AUTO: 9.1 %
NEUTROPHILS # BLD AUTO: 5.05 X10*3/UL (ref 1.6–5.5)
NEUTROPHILS NFR BLD AUTO: 66.6 %
NRBC BLD-RTO: 0 /100 WBCS (ref 0–0)
PLATELET # BLD AUTO: 233 X10*3/UL (ref 150–450)
RBC # BLD AUTO: 5.36 X10*6/UL (ref 4–5.2)
WBC # BLD AUTO: 7.6 X10*3/UL (ref 4.4–11.3)

## 2024-07-01 PROCEDURE — 3077F SYST BP >= 140 MM HG: CPT | Performed by: NURSE PRACTITIONER

## 2024-07-01 PROCEDURE — 99214 OFFICE O/P EST MOD 30 MIN: CPT | Performed by: NURSE PRACTITIONER

## 2024-07-01 PROCEDURE — 1160F RVW MEDS BY RX/DR IN RCRD: CPT | Performed by: NURSE PRACTITIONER

## 2024-07-01 PROCEDURE — 36415 COLL VENOUS BLD VENIPUNCTURE: CPT

## 2024-07-01 PROCEDURE — 1036F TOBACCO NON-USER: CPT | Performed by: NURSE PRACTITIONER

## 2024-07-01 PROCEDURE — 1159F MED LIST DOCD IN RCRD: CPT | Performed by: NURSE PRACTITIONER

## 2024-07-01 PROCEDURE — 3078F DIAST BP <80 MM HG: CPT | Performed by: NURSE PRACTITIONER

## 2024-07-01 RX ORDER — ALBUTEROL SULFATE 0.83 MG/ML
2.5 SOLUTION RESPIRATORY (INHALATION) EVERY 4 HOURS PRN
Qty: 360 ML | Refills: 11 | Status: SHIPPED | OUTPATIENT
Start: 2024-07-01

## 2024-07-01 RX ORDER — ALBUTEROL SULFATE 90 UG/1
2 AEROSOL, METERED RESPIRATORY (INHALATION) EVERY 4 HOURS PRN
Qty: 54 G | Refills: 2 | Status: SHIPPED | OUTPATIENT
Start: 2024-07-01

## 2024-07-01 RX ORDER — BUDESONIDE, GLYCOPYRROLATE, AND FORMOTEROL FUMARATE 160; 9; 4.8 UG/1; UG/1; UG/1
2 AEROSOL, METERED RESPIRATORY (INHALATION) 2 TIMES DAILY
Qty: 31 G | Refills: 2 | Status: SHIPPED | OUTPATIENT
Start: 2024-07-01

## 2024-07-01 NOTE — PROGRESS NOTES
Subjective   Patient ID: Hali Christianson is a 79 y.o. female who presents for No chief complaint on file..  HPI  New patient here today to establish care. Patient recently moved from [place] to stay with her daughter here. While in [place] she was seeing a pulmonologist. She gave us the name of the pulmonologist so we can contact his office for records. She is doing well other than when she uses the Trelegy she gags and will vomit. I really think it is the powdered inhaler. She says she tries to rinse and everything but is not working for her she was fine with Symbicort so I would like to try Breztri. She continues to use 3 L of oxygen she said she started oxygen about 8 or 9 months ago during the hospital stay. She tells me she had a PFT for 5 months ago with her doctor in [place]. We will call to get results. She did have a chest CT which did show some small nodules the largest being 6 mm. Also showed emphysematous changes and some bullae. Patient purchased her own [employer] daily concentrator. I would like her to have a nebulizer with albuterol for home use    03/09/23 she is here today for follow-up. She has been doing well. She does like the Breztri and she has to use her rescue inhaler only on occasion. She continues on 3 L of oxygen. We talked about measuring her oxygen at home but she cannot afford a pulse oximeter. Going to order one through the pharmacy and see if it is covered. She she complains that she does have rhinitis I will send in a nasal spray for her. She will be due for a new chest CT in October 12/27/23 she is here today for follow-up.  She has been doing well.  She continues to use Breztri daily uses her albuterol twice a week.  She has had no visits to the ER for respiratory.  Her only visit was for bee sting.  She uses 3 L of oxygen continuously.  She did take her O2 off she walked to the scale to get weighed back to the exam room and she was only 82%.  She put her 3 L of O2 back on and  osorio to 93%.    07/01/24 she is here today for follow-up.  She has been doing well.  Upon arrival she was 79% on 3 L.  She is on pulsed patient seems to be a mouth breather especially when she is speaking.  I had her sit and do some breathing through her nose and she quickly osorio to 90%.  At home she uses her concentrator which is continuous airflow.  She uses her Breztri and albuterol as needed.  She would be a great candidate for pulmonary rehab but currently is without a vehicle and wants to wait until she obtains 1      Previous pulmonary history:   She has no history of recurrent infections, or lung disease as a child. She was previously told she has copd . She currently is on supplemental oxygen. She has never been to pulmonary rehab. Does not recall having AECOPD requiring antibiotics or prednisone.     Inhalers/nebulized medications: Trelegy and albuterol      Hospitalization History:  She has not been hospitalized over the last year for breathing related problem.     Sleep history:  Denies snoring, apneas, feeling tired during the day or taking naps during the day..  Does not take frequent naps. Does not feel tired during the day or take frequent naps.  STOP-BANG score of      Comorbidities:  Hypertension  Diabetes  Diabetic neuropathy    SH:  smoking:former   drinking: none  illicit drug use: none     Occupation: (Full questionnaire on exposures obtained, discussed with the patient and scanned to EMR)  worked as   No known exposure to asbestos, silica or beryllium     Family History:  No family history of lung diseases or cancer     Imaging history: (I have personally reviewed the imaging below)     PFTs:  // -> FEV1/FVC ratio/FEV1 (no BD response)/FVC/DLCO /TLC/RV to TLC ratio     6 MWTs: None on record     Lung biopsy: None on record     Echo: None on record  -> Normal EF, diastolic dysfunction, with normal LA, RV size and function     Review of Systems   All other systems reviewed and are  negative.      Objective   Physical Exam  Vitals and nursing note reviewed.   Constitutional:       Appearance: Normal appearance.   HENT:      Head: Normocephalic.      Nose: Nose normal.   Eyes:      Pupils: Pupils are equal, round, and reactive to light.   Cardiovascular:      Rate and Rhythm: Normal rate.   Pulmonary:      Effort: Pulmonary effort is normal.      Breath sounds: Normal breath sounds.   Neurological:      General: No focal deficit present.      Mental Status: She is alert and oriented to person, place, and time. Mental status is at baseline.   Psychiatric:         Mood and Affect: Mood normal.         Behavior: Behavior normal.         Thought Content: Thought content normal.         Judgment: Judgment normal.         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 Idealy, inhaler regimen should include LABA, LAMA, ICS and prn JEYSON.   -Recurrent AECOPD in patient with chronic bronchitis and FEV1<50% Roflimulast,   -Recurrent AECOPD in a former smoker, will start macrolide therapy (azithromycin 250mg three times per week).  . A FAUSTINA score of (0-2, 3-4, 5-6, 7-10) conveys a 4-year predicted survival of 90%, 80%, 70%, 20% or less).  -Advanced therapies including LVRS and Lung transplantation.  -Counseled on the role of diet and exercise  -Vaccinations: Yearly influenza vaccines, Pneumoccocal (both PCV13 and PPSV23 for all patients 65 y.o or above)  -Depression score.  -Sleep evaluation as  below.  -Echo to evaluate for core pulmonale.  -Does not need oxygen at rest. Oxygen need evaluation with walking and sleep (nighttime oximetry)   - She tells me the Trelegy makes her gag and then she vomits. I will change her to Breztri   -Would also like her to have a nebulizer with albuterol for home use  -She had a PFT 5 months ago in [place] we will call to obtain records - records have not yet been received   - 03/09/23 she continues on Breztri with good response and uses albuterol as needed  12/27/23 she uses her Breztri and uses albuterol 2x a week. She has been feeling well. Needs refills on her meds    07/01/24 She continues on Breztri and uses her albuterol as needed sometimes 2x a day.  She would be a great candidate for pulmonary rehabilitation.  We talked about this today but patient is working on getting a car and then she would be able to do pulmonary rehab.    #Chronic respiratory failure   -Patient uses 3 liters of oxygen continuously   - She can benefit from a pulse oximeter at home so she can monitor her oxygen  12/27/23 She uses 3L and was only 82 % when she took her 02 off to walk to get weighed. When she put her 02 back on she osorio to 93%   07/01/24 she continues on 3L she was 79 when she arrived but is not sitting comfortably at 90%. She is on pulsed and has to remember to take breathes through her nose. She just had her 02 concentrator replaced from [facility]    # Lung nodule   - 6mm RML lung nodule   -High risk since she was a smoker   -We will repeat in 1 year 10/23   12/27/23 she had a chest Ct in September. Showing a 6 mm nodule unchanged from 9/2022      Mrs. Christianson it was a pleasure seeing you in the office today. We discussed the following:      - Please continue your Breztri this is 2 puffs twice a day   - Please use your albuterol as needed   - Use your nebulizer with albuterol as needed    - Please continue your oxygen keeping it above 90%      Please follow-up in 6 months     Georgie DAVIDSON  NOEMÍ Cook-COURTNEY 07/01/24 8:23 AM

## 2024-07-15 ENCOUNTER — APPOINTMENT (OUTPATIENT)
Dept: PRIMARY CARE | Facility: CLINIC | Age: 79
End: 2024-07-15
Payer: MEDICARE

## 2024-07-15 VITALS
WEIGHT: 127.2 LBS | HEART RATE: 76 BPM | OXYGEN SATURATION: 90 % | TEMPERATURE: 96.1 F | BODY MASS INDEX: 24.84 KG/M2 | SYSTOLIC BLOOD PRESSURE: 118 MMHG | DIASTOLIC BLOOD PRESSURE: 72 MMHG

## 2024-07-15 DIAGNOSIS — I95.1 ORTHOSTATIC HYPOTENSION: Primary | ICD-10-CM

## 2024-07-15 DIAGNOSIS — M10.9 GOUT, UNSPECIFIED: ICD-10-CM

## 2024-07-15 DIAGNOSIS — I10 HYPERTENSION, UNSPECIFIED TYPE: ICD-10-CM

## 2024-07-15 DIAGNOSIS — E78.5 HYPERLIPIDEMIA, UNSPECIFIED HYPERLIPIDEMIA TYPE: ICD-10-CM

## 2024-07-15 DIAGNOSIS — I10 BENIGN ESSENTIAL HYPERTENSION: ICD-10-CM

## 2024-07-15 DIAGNOSIS — E78.00 HYPERCHOLESTEREMIA: ICD-10-CM

## 2024-07-15 DIAGNOSIS — E11.69 TYPE 2 DIABETES MELLITUS WITH OTHER SPECIFIED COMPLICATION, UNSPECIFIED WHETHER LONG TERM INSULIN USE (MULTI): ICD-10-CM

## 2024-07-15 DIAGNOSIS — E87.6 HYPOKALEMIA: ICD-10-CM

## 2024-07-15 PROCEDURE — 3078F DIAST BP <80 MM HG: CPT | Performed by: INTERNAL MEDICINE

## 2024-07-15 PROCEDURE — 1159F MED LIST DOCD IN RCRD: CPT | Performed by: INTERNAL MEDICINE

## 2024-07-15 PROCEDURE — 99214 OFFICE O/P EST MOD 30 MIN: CPT | Performed by: INTERNAL MEDICINE

## 2024-07-15 PROCEDURE — 3074F SYST BP LT 130 MM HG: CPT | Performed by: INTERNAL MEDICINE

## 2024-07-15 PROCEDURE — 1036F TOBACCO NON-USER: CPT | Performed by: INTERNAL MEDICINE

## 2024-07-15 PROCEDURE — 1160F RVW MEDS BY RX/DR IN RCRD: CPT | Performed by: INTERNAL MEDICINE

## 2024-07-15 RX ORDER — POTASSIUM CHLORIDE 750 MG/1
10 TABLET, FILM COATED, EXTENDED RELEASE ORAL 2 TIMES DAILY
Qty: 60 TABLET | Refills: 11 | Status: SHIPPED | OUTPATIENT
Start: 2024-07-15 | End: 2025-07-15

## 2024-07-15 RX ORDER — FUROSEMIDE 20 MG/1
20 TABLET ORAL DAILY
Qty: 90 TABLET | Refills: 1 | Status: SHIPPED | OUTPATIENT
Start: 2024-07-15

## 2024-07-15 RX ORDER — ALLOPURINOL 100 MG/1
100 TABLET ORAL 3 TIMES DAILY
Qty: 270 TABLET | Refills: 2 | Status: SHIPPED | OUTPATIENT
Start: 2024-07-15

## 2024-07-15 ASSESSMENT — ENCOUNTER SYMPTOMS
DIFFICULTY URINATING: 0
BRUISES/BLEEDS EASILY: 0
FEVER: 0
WHEEZING: 0
DIZZINESS: 0
SORE THROAT: 0
PALPITATIONS: 0
ARTHRALGIAS: 0
HEADACHES: 0
COUGH: 0
FATIGUE: 1
DIARRHEA: 0
SINUS PAIN: 0
ABDOMINAL PAIN: 0
BLOOD IN STOOL: 0
UNEXPECTED WEIGHT CHANGE: 0

## 2024-07-15 NOTE — PROGRESS NOTES
Subjective   Patient ID: Hali Christianson is a 79 y.o. female who presents for Medication Reaction (Losartan causing headache and cough and nausea ).    - Patient feels dizzy very weak after switching to losartan patient called the last week instructed discontinue losartan comes today for evaluation feeling much better blood pressure normal range  May hold the losartan until further eval in 3 months  - Anemia compensated need to continue with iron 324 mg daily  -Hypokalemia patient unable to swallow current potassium tablet switch patient to potassium ER 10 mg twice a day new prescription provided  -Diabetes controlled improving hemoglobin A1c 6.5 continue low-carb diet  -Chronic gout patient seen by DR DEMARCO on allopurinol allopurinol again doing much better  -Chronic renal insufficiency continue with current medication refer patient to nephrology DR DEMARCO  -Chronic respiratory failure continues 2 L oxygen  - Peripheral vascular disease symptomatic follow-up and referral to vascular surgery today  - Lung nodule continue monitoring by pulmonary  -Follow-up 3 months                   Review of Systems   Constitutional:  Positive for fatigue. Negative for fever and unexpected weight change.   HENT:  Negative for congestion, ear discharge, ear pain, mouth sores, sinus pain and sore throat.    Eyes:  Negative for visual disturbance.   Respiratory:  Negative for cough and wheezing.    Cardiovascular:  Negative for chest pain, palpitations and leg swelling.   Gastrointestinal:  Negative for abdominal pain, blood in stool and diarrhea.   Genitourinary:  Negative for difficulty urinating.   Musculoskeletal:  Negative for arthralgias.   Skin:  Negative for rash.   Neurological:  Negative for dizziness and headaches.   Hematological:  Does not bruise/bleed easily.   Psychiatric/Behavioral:  Negative for behavioral problems.    All other systems reviewed and are negative.      Objective   Lab Results   Component Value Date     HGBA1C 6.5 11/30/2023      /72   Pulse 76   Temp 35.6 °C (96.1 °F)   Wt 57.7 kg (127 lb 3.2 oz)   SpO2 90%   BMI 24.84 kg/m²     Physical Exam  Vitals and nursing note reviewed.   Constitutional:       Appearance: Normal appearance.   HENT:      Head: Normocephalic.      Nose: Nose normal.   Eyes:      Conjunctiva/sclera: Conjunctivae normal.      Pupils: Pupils are equal, round, and reactive to light.   Cardiovascular:      Rate and Rhythm: Regular rhythm.   Pulmonary:      Effort: Pulmonary effort is normal.      Breath sounds: Normal breath sounds.   Abdominal:      General: Abdomen is flat.      Palpations: Abdomen is soft.   Musculoskeletal:      Cervical back: Neck supple.   Skin:     General: Skin is warm.   Neurological:      General: No focal deficit present.      Mental Status: She is oriented to person, place, and time.   Psychiatric:         Mood and Affect: Mood normal.         Assessment/Plan   Hali was seen today for medication reaction.  Diagnoses and all orders for this visit:  Orthostatic hypotension (Primary)  Hypertension, unspecified type  Hypercholesteremia  Type 2 diabetes mellitus with other specified complication, unspecified whether long term insulin use (Multi)  Hyperlipidemia, unspecified hyperlipidemia type   - Patient feels dizzy very weak after switching to losartan patient called the last week instructed discontinue losartan comes today for evaluation feeling much better blood pressure normal range  May hold the losartan until further eval in 3 months  - Anemia compensated need to continue with iron 324 mg daily  -Hypokalemia patient unable to swallow current potassium tablet switch patient to potassium ER 10 mg twice a day new prescription provided  -Diabetes controlled improving hemoglobin A1c 6.5 continue low-carb diet  -Chronic gout patient seen by DR DEMARCO on allopurinol allopurinol again doing much better  -Chronic renal insufficiency continue with current  medication refer patient to nephrology DR DEMARCO  -Chronic respiratory failure continues 2 L oxygen  - Peripheral vascular disease symptomatic follow-up and referral to vascular surgery today  - Lung nodule continue monitoring by pulmonary  -Follow-up 3 months

## 2024-08-28 DIAGNOSIS — E11.69 TYPE 2 DIABETES MELLITUS WITH OTHER SPECIFIED COMPLICATION, UNSPECIFIED WHETHER LONG TERM INSULIN USE (MULTI): ICD-10-CM

## 2024-08-28 RX ORDER — METFORMIN HYDROCHLORIDE 500 MG/1
500 TABLET, EXTENDED RELEASE ORAL EVERY 12 HOURS
Qty: 180 TABLET | Refills: 0 | Status: SHIPPED | OUTPATIENT
Start: 2024-08-28

## 2024-08-28 RX ORDER — GLIPIZIDE 5 MG/1
5 TABLET ORAL
Qty: 180 TABLET | Refills: 0 | Status: SHIPPED | OUTPATIENT
Start: 2024-08-28

## 2024-09-18 ENCOUNTER — APPOINTMENT (OUTPATIENT)
Dept: PRIMARY CARE | Facility: CLINIC | Age: 79
End: 2024-09-18
Payer: MEDICARE

## 2024-09-18 VITALS
TEMPERATURE: 96.3 F | BODY MASS INDEX: 24.1 KG/M2 | DIASTOLIC BLOOD PRESSURE: 78 MMHG | OXYGEN SATURATION: 84 % | HEART RATE: 72 BPM | SYSTOLIC BLOOD PRESSURE: 120 MMHG | WEIGHT: 123.4 LBS

## 2024-09-18 DIAGNOSIS — E78.00 HYPERCHOLESTEREMIA: ICD-10-CM

## 2024-09-18 DIAGNOSIS — Z79.4 TYPE 2 DIABETES MELLITUS WITH OTHER SPECIFIED COMPLICATION, WITH LONG-TERM CURRENT USE OF INSULIN: Primary | ICD-10-CM

## 2024-09-18 DIAGNOSIS — J96.11 CHRONIC RESPIRATORY FAILURE WITH HYPOXIA (MULTI): ICD-10-CM

## 2024-09-18 DIAGNOSIS — E11.69 TYPE 2 DIABETES MELLITUS WITH OTHER SPECIFIED COMPLICATION, WITH LONG-TERM CURRENT USE OF INSULIN: Primary | ICD-10-CM

## 2024-09-18 DIAGNOSIS — I10 BENIGN ESSENTIAL HYPERTENSION: ICD-10-CM

## 2024-09-18 DIAGNOSIS — E11.69 TYPE 2 DIABETES MELLITUS WITH OTHER SPECIFIED COMPLICATION, UNSPECIFIED WHETHER LONG TERM INSULIN USE (MULTI): ICD-10-CM

## 2024-09-18 DIAGNOSIS — M10.9 GOUT, UNSPECIFIED CAUSE, UNSPECIFIED CHRONICITY, UNSPECIFIED SITE: ICD-10-CM

## 2024-09-18 DIAGNOSIS — I10 HYPERTENSION, UNSPECIFIED TYPE: ICD-10-CM

## 2024-09-18 LAB
POC FINGERSTICK BLOOD GLUCOSE: 171 MG/DL (ref 70–100)
POC HEMOGLOBIN A1C: 6.6 % (ref 4.2–6.5)

## 2024-09-18 PROCEDURE — 1159F MED LIST DOCD IN RCRD: CPT | Performed by: INTERNAL MEDICINE

## 2024-09-18 PROCEDURE — 1036F TOBACCO NON-USER: CPT | Performed by: INTERNAL MEDICINE

## 2024-09-18 PROCEDURE — 3074F SYST BP LT 130 MM HG: CPT | Performed by: INTERNAL MEDICINE

## 2024-09-18 PROCEDURE — 99214 OFFICE O/P EST MOD 30 MIN: CPT | Performed by: INTERNAL MEDICINE

## 2024-09-18 PROCEDURE — 1160F RVW MEDS BY RX/DR IN RCRD: CPT | Performed by: INTERNAL MEDICINE

## 2024-09-18 PROCEDURE — 83036 HEMOGLOBIN GLYCOSYLATED A1C: CPT | Performed by: INTERNAL MEDICINE

## 2024-09-18 PROCEDURE — 3078F DIAST BP <80 MM HG: CPT | Performed by: INTERNAL MEDICINE

## 2024-09-18 PROCEDURE — 82962 GLUCOSE BLOOD TEST: CPT | Performed by: INTERNAL MEDICINE

## 2024-09-18 ASSESSMENT — ENCOUNTER SYMPTOMS
BLOOD IN STOOL: 0
COUGH: 0
BRUISES/BLEEDS EASILY: 0
ABDOMINAL PAIN: 0
DIZZINESS: 0
SINUS PAIN: 0
PALPITATIONS: 0
WHEEZING: 0
UNEXPECTED WEIGHT CHANGE: 0
SORE THROAT: 0
HEADACHES: 0
ARTHRALGIAS: 0
HYPERTENSION: 1
FEVER: 0
DIFFICULTY URINATING: 0
FATIGUE: 1
DIARRHEA: 0

## 2024-09-18 NOTE — PROGRESS NOTES
Subjective   Patient ID: Hali Christianson is a 79 y.o. female who presents for Diabetes (A1C, random), Flu Vaccine (Decline Allergy), and Hypertension.     --Chronic dizziness improved patient will take losartan continue monitor conservatively  - Diabetes controlled hemoglobin A1c 6.6 continue low-carb diet exercise  - Chronic gout controlled  - Chronic hypoxemia and respiratory failure continues doing 2-hour continuous oxygen 2 L patient asking for portable oxygen patient will discuss with pulmonary visit  Disability placard provided  - Anemia compensated need to continue with iron 324 mg daily  -Hypokalemia patient unable to swallow current potassium tablet switch patient to potassium ER 10 mg twice a day new prescription provided  -Chronic gout patient seen by DR DEMARCO on allopurinol allopurinol again doing much better  -Chronic renal insufficiency continue with current medication refer patient to nephrology DR DEMARCO  -Chronic respiratory failure continues 2 L oxygen  - Peripheral vascular disease symptomatic follow-up and referral to vascular surgery today  - Lung nodule continue monitoring by pulmonary  Follow-up 5 months Medicare physical      Diabetes  Pertinent negatives for hypoglycemia include no dizziness or headaches. Associated symptoms include fatigue. Pertinent negatives for diabetes include no chest pain.   Hypertension  Pertinent negatives include no chest pain, headaches or palpitations.          Review of Systems   Constitutional:  Positive for fatigue. Negative for fever and unexpected weight change.   HENT:  Negative for congestion, ear discharge, ear pain, mouth sores, sinus pain and sore throat.    Eyes:  Negative for visual disturbance.   Respiratory:  Negative for cough and wheezing.    Cardiovascular:  Negative for chest pain, palpitations and leg swelling.   Gastrointestinal:  Negative for abdominal pain, blood in stool and diarrhea.   Genitourinary:  Negative for difficulty urinating.    Musculoskeletal:  Negative for arthralgias.   Skin:  Negative for rash.   Neurological:  Negative for dizziness and headaches.   Hematological:  Does not bruise/bleed easily.   Psychiatric/Behavioral:  Negative for behavioral problems.    All other systems reviewed and are negative.      Objective   Lab Results   Component Value Date    HGBA1C 6.6 (A) 09/18/2024      /78   Pulse 72   Temp 35.7 °C (96.3 °F)   Wt 56 kg (123 lb 6.4 oz)   SpO2 (!) 84%   BMI 24.10 kg/m²   Lab Results   Component Value Date    WBC 7.6 07/01/2024    HGB 15.7 07/01/2024    HCT 48.9 (H) 07/01/2024     07/01/2024    CHOL 160 05/30/2024    TRIG 125 05/30/2024    HDL 48.9 05/30/2024    ALT 17 05/30/2024    AST 24 05/30/2024     05/30/2024    K 4.1 05/30/2024     05/30/2024    CREATININE 0.97 05/30/2024    BUN 17 05/30/2024    CO2 24 05/30/2024    TSH 1.44 05/30/2024    HGBA1C 6.6 (A) 09/18/2024     par   Physical Exam  Vitals and nursing note reviewed.   Constitutional:       Appearance: Normal appearance.   HENT:      Head: Normocephalic.      Nose: Nose normal.   Eyes:      Conjunctiva/sclera: Conjunctivae normal.      Pupils: Pupils are equal, round, and reactive to light.   Cardiovascular:      Rate and Rhythm: Regular rhythm.   Pulmonary:      Effort: Pulmonary effort is normal.      Breath sounds: Normal breath sounds.   Abdominal:      General: Abdomen is flat.      Palpations: Abdomen is soft.   Musculoskeletal:      Cervical back: Neck supple.   Skin:     General: Skin is warm.   Neurological:      General: No focal deficit present.      Mental Status: She is oriented to person, place, and time.   Psychiatric:         Mood and Affect: Mood normal.         Assessment/Plan   Hali was seen today for diabetes, flu vaccine and hypertension.  Diagnoses and all orders for this visit:  Type 2 diabetes mellitus with other specified complication, with long-term current use of insulin (Multi) (Primary)  -     POCT  fingerstick glucose manually resulted  -     POCT glycosylated hemoglobin (Hb A1C) manually resulted  Chronic respiratory failure with hypoxia (Multi)  -     Disability Placard  -     Portable Oxygen Condenser  Hypercholesteremia  Hypertension, unspecified type  Gout, unspecified cause, unspecified chronicity, unspecified site  Benign essential hypertension  Type 2 diabetes mellitus with other specified complication, unspecified whether long term insulin use (Multi)  Other orders  -     Follow Up In Primary Care - Established  -     Follow Up In Primary Care - Medicare Annual; Future    --Chronic dizziness improved patient will take losartan continue monitor conservatively  - Diabetes controlled hemoglobin A1c 6.6 continue low-carb diet exercise  - Chronic gout controlled  - Chronic hypoxemia and respiratory failure continues doing 2-hour continuous oxygen 2 L patient asking for portable oxygen patient will discuss with pulmonary visit  Disability placard provided  - Anemia compensated need to continue with iron 324 mg daily  -Hypokalemia patient unable to swallow current potassium tablet switch patient to potassium ER 10 mg twice a day new prescription provided  -Chronic gout patient seen by DR DEMARCO on allopurinol allopurinol again doing much better  -Chronic renal insufficiency continue with current medication refer patient to nephrology DR DEMARCO  -Chronic respiratory failure continues 2 L oxygen  - Peripheral vascular disease symptomatic follow-up and referral to vascular surgery today  - Lung nodule continue monitoring by pulmonary  Follow-up 5 months Medicare physical

## 2024-10-15 ENCOUNTER — APPOINTMENT (OUTPATIENT)
Dept: PRIMARY CARE | Facility: CLINIC | Age: 79
End: 2024-10-15
Payer: MEDICARE

## 2024-10-30 ENCOUNTER — HOSPITAL ENCOUNTER (EMERGENCY)
Facility: HOSPITAL | Age: 79
Discharge: HOME | End: 2024-10-30
Payer: MEDICARE

## 2024-10-30 VITALS
RESPIRATION RATE: 18 BRPM | DIASTOLIC BLOOD PRESSURE: 71 MMHG | BODY MASS INDEX: 22.68 KG/M2 | TEMPERATURE: 98 F | WEIGHT: 128 LBS | HEIGHT: 63 IN | OXYGEN SATURATION: 97 % | HEART RATE: 72 BPM | SYSTOLIC BLOOD PRESSURE: 146 MMHG

## 2024-10-30 DIAGNOSIS — R10.84 GENERALIZED ABDOMINAL PAIN: Primary | ICD-10-CM

## 2024-10-30 DIAGNOSIS — J18.9 PNEUMONIA OF RIGHT LOWER LOBE DUE TO INFECTIOUS ORGANISM: ICD-10-CM

## 2024-10-30 LAB
ALBUMIN SERPL BCP-MCNC: 4.4 G/DL (ref 3.4–5)
ALP SERPL-CCNC: 62 U/L (ref 33–136)
ALT SERPL W P-5'-P-CCNC: 9 U/L (ref 7–45)
ANION GAP SERPL CALC-SCNC: 13 MMOL/L (ref 10–20)
AST SERPL W P-5'-P-CCNC: 18 U/L (ref 9–39)
BASOPHILS # BLD AUTO: 0.05 X10*3/UL (ref 0–0.1)
BASOPHILS NFR BLD AUTO: 0.6 %
BILIRUB SERPL-MCNC: 0.9 MG/DL (ref 0–1.2)
BUN SERPL-MCNC: 26 MG/DL (ref 6–23)
CALCIUM SERPL-MCNC: 9.5 MG/DL (ref 8.6–10.3)
CHLORIDE SERPL-SCNC: 103 MMOL/L (ref 98–107)
CO2 SERPL-SCNC: 28 MMOL/L (ref 21–32)
CREAT SERPL-MCNC: 0.98 MG/DL (ref 0.5–1.05)
EGFRCR SERPLBLD CKD-EPI 2021: 58 ML/MIN/1.73M*2
EOSINOPHIL # BLD AUTO: 0.12 X10*3/UL (ref 0–0.4)
EOSINOPHIL NFR BLD AUTO: 1.4 %
ERYTHROCYTE [DISTWIDTH] IN BLOOD BY AUTOMATED COUNT: 13.1 % (ref 11.5–14.5)
GLUCOSE SERPL-MCNC: 110 MG/DL (ref 74–99)
HCT VFR BLD AUTO: 46.7 % (ref 36–46)
HGB BLD-MCNC: 15.5 G/DL (ref 12–16)
IMM GRANULOCYTES # BLD AUTO: 0.03 X10*3/UL (ref 0–0.5)
IMM GRANULOCYTES NFR BLD AUTO: 0.4 % (ref 0–0.9)
LACTATE SERPL-SCNC: 1.2 MMOL/L (ref 0.4–2)
LIPASE SERPL-CCNC: 28 U/L (ref 9–82)
LYMPHOCYTES # BLD AUTO: 1.85 X10*3/UL (ref 0.8–3)
LYMPHOCYTES NFR BLD AUTO: 21.8 %
MCH RBC QN AUTO: 30.2 PG (ref 26–34)
MCHC RBC AUTO-ENTMCNC: 33.2 G/DL (ref 32–36)
MCV RBC AUTO: 91 FL (ref 80–100)
MONOCYTES # BLD AUTO: 1.05 X10*3/UL (ref 0.05–0.8)
MONOCYTES NFR BLD AUTO: 12.4 %
NEUTROPHILS # BLD AUTO: 5.37 X10*3/UL (ref 1.6–5.5)
NEUTROPHILS NFR BLD AUTO: 63.4 %
NRBC BLD-RTO: 0 /100 WBCS (ref 0–0)
PLATELET # BLD AUTO: 217 X10*3/UL (ref 150–450)
POTASSIUM SERPL-SCNC: 3.2 MMOL/L (ref 3.5–5.3)
PROT SERPL-MCNC: 6.9 G/DL (ref 6.4–8.2)
RBC # BLD AUTO: 5.14 X10*6/UL (ref 4–5.2)
SODIUM SERPL-SCNC: 141 MMOL/L (ref 136–145)
WBC # BLD AUTO: 8.5 X10*3/UL (ref 4.4–11.3)

## 2024-10-30 PROCEDURE — 85025 COMPLETE CBC W/AUTO DIFF WBC: CPT | Performed by: PHYSICIAN ASSISTANT

## 2024-10-30 PROCEDURE — 2500000002 HC RX 250 W HCPCS SELF ADMINISTERED DRUGS (ALT 637 FOR MEDICARE OP, ALT 636 FOR OP/ED): Performed by: PHYSICIAN ASSISTANT

## 2024-10-30 PROCEDURE — 80053 COMPREHEN METABOLIC PANEL: CPT | Performed by: PHYSICIAN ASSISTANT

## 2024-10-30 PROCEDURE — 83690 ASSAY OF LIPASE: CPT | Performed by: PHYSICIAN ASSISTANT

## 2024-10-30 PROCEDURE — 36415 COLL VENOUS BLD VENIPUNCTURE: CPT | Performed by: PHYSICIAN ASSISTANT

## 2024-10-30 PROCEDURE — 99283 EMERGENCY DEPT VISIT LOW MDM: CPT

## 2024-10-30 PROCEDURE — 83605 ASSAY OF LACTIC ACID: CPT | Performed by: PHYSICIAN ASSISTANT

## 2024-10-30 RX ORDER — AZITHROMYCIN 250 MG/1
250 TABLET, FILM COATED ORAL DAILY
Qty: 4 TABLET | Refills: 0 | Status: SHIPPED | OUTPATIENT
Start: 2024-10-31 | End: 2024-11-04

## 2024-10-30 RX ORDER — POTASSIUM CHLORIDE 20 MEQ/1
40 TABLET, EXTENDED RELEASE ORAL ONCE
Status: COMPLETED | OUTPATIENT
Start: 2024-10-30 | End: 2024-10-30

## 2024-10-30 RX ORDER — DICYCLOMINE HYDROCHLORIDE 20 MG/1
20 TABLET ORAL
Qty: 56 TABLET | Refills: 0 | Status: SHIPPED | OUTPATIENT
Start: 2024-10-30 | End: 2024-11-13

## 2024-10-30 RX ORDER — AZITHROMYCIN 250 MG/1
250 TABLET, FILM COATED ORAL ONCE
Status: COMPLETED | OUTPATIENT
Start: 2024-10-30 | End: 2024-10-30

## 2024-10-30 ASSESSMENT — PAIN SCALES - GENERAL: PAINLEVEL_OUTOF10: 8

## 2024-10-30 ASSESSMENT — COLUMBIA-SUICIDE SEVERITY RATING SCALE - C-SSRS
6. HAVE YOU EVER DONE ANYTHING, STARTED TO DO ANYTHING, OR PREPARED TO DO ANYTHING TO END YOUR LIFE?: NO
1. IN THE PAST MONTH, HAVE YOU WISHED YOU WERE DEAD OR WISHED YOU COULD GO TO SLEEP AND NOT WAKE UP?: NO
2. HAVE YOU ACTUALLY HAD ANY THOUGHTS OF KILLING YOURSELF?: NO

## 2024-10-30 ASSESSMENT — PAIN DESCRIPTION - LOCATION: LOCATION: BACK

## 2024-10-30 ASSESSMENT — PAIN - FUNCTIONAL ASSESSMENT: PAIN_FUNCTIONAL_ASSESSMENT: 0-10

## 2025-01-06 ENCOUNTER — APPOINTMENT (OUTPATIENT)
Dept: PULMONOLOGY | Facility: CLINIC | Age: 80
End: 2025-01-06
Payer: MEDICARE

## 2025-01-14 ENCOUNTER — APPOINTMENT (OUTPATIENT)
Dept: PULMONOLOGY | Facility: CLINIC | Age: 80
End: 2025-01-14
Payer: MEDICARE

## 2025-02-06 ENCOUNTER — APPOINTMENT (OUTPATIENT)
Dept: PULMONOLOGY | Facility: CLINIC | Age: 80
End: 2025-02-06
Payer: MEDICARE

## 2025-02-06 VITALS
WEIGHT: 116.8 LBS | BODY MASS INDEX: 20.69 KG/M2 | HEART RATE: 71 BPM | DIASTOLIC BLOOD PRESSURE: 86 MMHG | SYSTOLIC BLOOD PRESSURE: 217 MMHG | OXYGEN SATURATION: 85 %

## 2025-02-06 DIAGNOSIS — J43.2 CENTRILOBULAR EMPHYSEMA (MULTI): ICD-10-CM

## 2025-02-06 DIAGNOSIS — Z99.81 OXYGEN DEPENDENT: Primary | ICD-10-CM

## 2025-02-06 PROCEDURE — 99215 OFFICE O/P EST HI 40 MIN: CPT | Performed by: PEDIATRICS

## 2025-02-06 PROCEDURE — 1159F MED LIST DOCD IN RCRD: CPT | Performed by: PEDIATRICS

## 2025-02-06 PROCEDURE — 1160F RVW MEDS BY RX/DR IN RCRD: CPT | Performed by: PEDIATRICS

## 2025-02-06 PROCEDURE — 1036F TOBACCO NON-USER: CPT | Performed by: PEDIATRICS

## 2025-02-06 PROCEDURE — 3079F DIAST BP 80-89 MM HG: CPT | Performed by: PEDIATRICS

## 2025-02-06 PROCEDURE — 3077F SYST BP >= 140 MM HG: CPT | Performed by: PEDIATRICS

## 2025-02-06 RX ORDER — BUDESONIDE, GLYCOPYRROLATE, AND FORMOTEROL FUMARATE 160; 9; 4.8 UG/1; UG/1; UG/1
2 AEROSOL, METERED RESPIRATORY (INHALATION) 2 TIMES DAILY
Qty: 32.1 G | Refills: 3 | Status: SHIPPED | OUTPATIENT
Start: 2025-02-06

## 2025-02-06 RX ORDER — ALBUTEROL SULFATE 90 UG/1
2 INHALANT RESPIRATORY (INHALATION) EVERY 4 HOURS PRN
Qty: 54 G | Refills: 3 | Status: SHIPPED | OUTPATIENT
Start: 2025-02-06

## 2025-02-06 NOTE — PROGRESS NOTES
Subjective   Patient ID: Hali Christianson is a 79 y.o. female who presents for COPD, hypoxia    HPI    10/26/2022:  New patient here today to establish care. Patient recently moved from [place] to stay with her daughter here. While in [place] she was seeing a pulmonologist. She gave us the name of the pulmonologist so we can contact his office for records. She is doing well other than when she uses the Trelegy she gags and will vomit. I really think it is the powdered inhaler. She says she tries to rinse and everything but is not working for her she was fine with Symbicort so I would like to try Breztri. She continues to use 3 L of oxygen she said she started oxygen about 8 or 9 months ago during the hospital stay. She tells me she had a PFT for 5 months ago with her doctor in [place]. We will call to get results. She did have a chest CT which did show some small nodules the largest being 6 mm. Also showed emphysematous changes and some bullae. Patient purchased her own [email or web address] daily concentrator. I would like her to have a nebulizer with albuterol for home use     03/09/23 she is here today for follow-up. She has been doing well. She does like the Breztri and she has to use her rescue inhaler only on occasion. She continues on 3 L of oxygen. We talked about measuring her oxygen at home but she cannot afford a pulse oximeter. Going to order one through the pharmacy and see if it is covered. She she complains that she does have rhinitis I will send in a nasal spray for her. She will be due for a new chest CT in October 12/27/23 she is here today for follow-up.  She has been doing well.  She continues to use Breztri daily uses her albuterol twice a week.  She has had no visits to the ER for respiratory.  Her only visit was for bee sting.  She uses 3 L of oxygen continuously.  She did take her O2 off she walked to the scale to get weighed back to the exam room and she was only 82%.  She put her 3 L of O2 back on  and osorio to 93%.     07/01/24 she is here today for follow-up.  She has been doing well.  Upon arrival she was 79% on 3 L.  She is on pulsed patient seems to be a mouth breather especially when she is speaking.  I had her sit and do some breathing through her nose and she quickly osorio to 90%.  At home she uses her concentrator which is continuous airflow.  She uses her Breztri and albuterol as needed.  She would be a great candidate for pulmonary rehab but currently is without a vehicle and wants to wait until she obtains 1      2/6/2025:  Ms Christianson is doing well.  She came in without her POC and her saturation was 85%.  She had the POC but the battery ran out.  She is using breztri and albuterol as needed.  We discussed pulmonary rahab but she wants to wait until nicer weather.      Previous pulmonary history:   She has no history of recurrent infections, or lung disease as a child. She was previously told she has copd . She currently is on supplemental oxygen. She has never been to pulmonary rehab. Does not recall having AECOPD requiring antibiotics or prednisone.     Inhalers/nebulized medications: Trelegy and albuterol      Hospitalization History:  She has not been hospitalized over the last year for breathing related problem.     Sleep history:  Denies snoring, apneas, feeling tired during the day or taking naps during the day..  Does not take frequent naps. Does not feel tired during the day or take frequent naps.  STOP-BANG score of      Comorbidities:  Hypertension  Diabetes  Diabetic neuropathy     SH:  smoking:former   drinking: none  illicit drug use: none     Occupation: (Full questionnaire on exposures obtained, discussed with the patient and scanned to EMR)  worked as   No known exposure to asbestos, silica or beryllium     Family History:  No family history of lung diseases or cancer     Imaging history: (I have personally reviewed the imaging below)  1/7/2023:  severe emphysema     PFTs:  2/2/2022  ([facility]):  %, FEV1 119%, FEF 25-75 86%, TLC 91%, DLCO 24%     6 MWTs: None on record      Review of Systems    See scanned documents attached to this note for review of systems, and appropriate scales/scores for this visit.     Objective   Physical Exam  Constitutional:       Appearance: Normal appearance.   HENT:      Head: Normocephalic and atraumatic.      Mouth/Throat:      Pharynx: Oropharynx is clear.   Cardiovascular:      Rate and Rhythm: Normal rate and regular rhythm.      Pulses: Normal pulses.      Heart sounds: Normal heart sounds.   Pulmonary:      Effort: Pulmonary effort is normal.      Breath sounds: Normal breath sounds. No wheezing, rhonchi or rales.   Abdominal:      General: Bowel sounds are normal.      Palpations: Abdomen is soft.   Musculoskeletal:         General: Normal range of motion.   Skin:     General: Skin is warm and dry.   Neurological:      General: No focal deficit present.      Mental Status: She is alert and oriented to person, place, and time.   Psychiatric:         Mood and Affect: Mood normal.       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 She has been feeling well. Needs refills on her meds    07/01/24 She continues on Breztri and uses her albuterol as needed sometimes 2x a day.  She would be a great candidate for pulmonary rehabilitation.  We talked about this today but patient is working on getting a car and then she would be able to do pulmonary rehab.  2/6/2025:  continue breztri and albuterol.  Instructed her to call if she wants to start pulmonary rehab this spring or summer.     #Chronic respiratory failure   -Patient uses 3 liters of oxygen continuously   - She can benefit from a pulse oximeter at home so she can monitor her oxygen  12/27/23 She uses 3L and was only 82 % when she took her 02 off to  walk to get weighed. When she put her 02 back on she osorio to 93%   07/01/24 she continues on 3L she was 79 when she arrived but is not sitting comfortably at 90%. She is on pulsed and has to remember to take breathes through her nose. She just had her 02 concentrator replaced from [facility]  2/6/2025:  using oxygen continuously     # Lung nodule   - 6mm RML lung nodule   -High risk since she was a smoker   -We will repeat in 1 year 10/23   12/27/23 she had a chest Ct in September. Showing a 6 mm nodule unchanged from 9/2022           Please follow-up in 6 months           Olvin Cui MD 02/06/25 8:42 AM

## 2025-02-18 ENCOUNTER — APPOINTMENT (OUTPATIENT)
Dept: PRIMARY CARE | Facility: CLINIC | Age: 80
End: 2025-02-18
Payer: MEDICARE

## 2025-02-20 ENCOUNTER — APPOINTMENT (OUTPATIENT)
Dept: PRIMARY CARE | Facility: CLINIC | Age: 80
End: 2025-02-20
Payer: MEDICARE

## 2025-03-12 ENCOUNTER — TELEPHONE (OUTPATIENT)
Dept: PULMONOLOGY | Facility: CLINIC | Age: 80
End: 2025-03-12

## 2025-03-12 ENCOUNTER — APPOINTMENT (OUTPATIENT)
Dept: PRIMARY CARE | Facility: CLINIC | Age: 80
End: 2025-03-12
Payer: MEDICARE

## 2025-03-12 VITALS
HEART RATE: 71 BPM | WEIGHT: 119.8 LBS | BODY MASS INDEX: 21.23 KG/M2 | DIASTOLIC BLOOD PRESSURE: 86 MMHG | OXYGEN SATURATION: 90 % | SYSTOLIC BLOOD PRESSURE: 184 MMHG | HEIGHT: 63 IN | TEMPERATURE: 96.7 F

## 2025-03-12 DIAGNOSIS — I50.9 HEART FAILURE, UNSPECIFIED HF CHRONICITY, UNSPECIFIED HEART FAILURE TYPE: ICD-10-CM

## 2025-03-12 DIAGNOSIS — M06.09 RHEUMATOID ARTHRITIS OF MULTIPLE SITES WITH NEGATIVE RHEUMATOID FACTOR (MULTI): ICD-10-CM

## 2025-03-12 DIAGNOSIS — I10 BENIGN ESSENTIAL HYPERTENSION: ICD-10-CM

## 2025-03-12 DIAGNOSIS — D63.1 ANEMIA IN STAGE 3A CHRONIC KIDNEY DISEASE (MULTI): ICD-10-CM

## 2025-03-12 DIAGNOSIS — E11.69 TYPE 2 DIABETES MELLITUS WITH OTHER SPECIFIED COMPLICATION, WITH LONG-TERM CURRENT USE OF INSULIN: ICD-10-CM

## 2025-03-12 DIAGNOSIS — E55.9 VITAMIN D DEFICIENCY: ICD-10-CM

## 2025-03-12 DIAGNOSIS — E11.69 TYPE 2 DIABETES MELLITUS WITH OTHER SPECIFIED COMPLICATION, UNSPECIFIED WHETHER LONG TERM INSULIN USE (MULTI): ICD-10-CM

## 2025-03-12 DIAGNOSIS — D50.9 IRON DEFICIENCY ANEMIA, UNSPECIFIED IRON DEFICIENCY ANEMIA TYPE: ICD-10-CM

## 2025-03-12 DIAGNOSIS — E78.5 HYPERLIPIDEMIA, UNSPECIFIED HYPERLIPIDEMIA TYPE: ICD-10-CM

## 2025-03-12 DIAGNOSIS — J96.91 RESPIRATORY FAILURE WITH HYPOXIA, UNSPECIFIED CHRONICITY (MULTI): ICD-10-CM

## 2025-03-12 DIAGNOSIS — M10.9 GOUT, UNSPECIFIED CAUSE, UNSPECIFIED CHRONICITY, UNSPECIFIED SITE: ICD-10-CM

## 2025-03-12 DIAGNOSIS — J44.9 CHRONIC OBSTRUCTIVE PULMONARY DISEASE, UNSPECIFIED COPD TYPE (MULTI): Primary | ICD-10-CM

## 2025-03-12 DIAGNOSIS — Z78.0 MENOPAUSE: ICD-10-CM

## 2025-03-12 DIAGNOSIS — E87.6 HYPOKALEMIA: ICD-10-CM

## 2025-03-12 DIAGNOSIS — M10.9 GOUT, UNSPECIFIED: ICD-10-CM

## 2025-03-12 DIAGNOSIS — Z00.00 ROUTINE GENERAL MEDICAL EXAMINATION AT HEALTH CARE FACILITY: Primary | ICD-10-CM

## 2025-03-12 DIAGNOSIS — Z79.4 TYPE 2 DIABETES MELLITUS WITH OTHER SPECIFIED COMPLICATION, WITH LONG-TERM CURRENT USE OF INSULIN: ICD-10-CM

## 2025-03-12 DIAGNOSIS — I10 HYPERTENSION, UNSPECIFIED TYPE: ICD-10-CM

## 2025-03-12 DIAGNOSIS — Z12.31 ENCOUNTER FOR SCREENING MAMMOGRAM FOR MALIGNANT NEOPLASM OF BREAST: ICD-10-CM

## 2025-03-12 DIAGNOSIS — N18.31 ANEMIA IN STAGE 3A CHRONIC KIDNEY DISEASE (MULTI): ICD-10-CM

## 2025-03-12 PROCEDURE — 1159F MED LIST DOCD IN RCRD: CPT | Performed by: INTERNAL MEDICINE

## 2025-03-12 PROCEDURE — 99397 PER PM REEVAL EST PAT 65+ YR: CPT | Performed by: INTERNAL MEDICINE

## 2025-03-12 PROCEDURE — 1036F TOBACCO NON-USER: CPT | Performed by: INTERNAL MEDICINE

## 2025-03-12 PROCEDURE — 3077F SYST BP >= 140 MM HG: CPT | Performed by: INTERNAL MEDICINE

## 2025-03-12 PROCEDURE — 1160F RVW MEDS BY RX/DR IN RCRD: CPT | Performed by: INTERNAL MEDICINE

## 2025-03-12 PROCEDURE — G0439 PPPS, SUBSEQ VISIT: HCPCS | Performed by: INTERNAL MEDICINE

## 2025-03-12 PROCEDURE — 1124F ACP DISCUSS-NO DSCNMKR DOCD: CPT | Performed by: INTERNAL MEDICINE

## 2025-03-12 PROCEDURE — 3079F DIAST BP 80-89 MM HG: CPT | Performed by: INTERNAL MEDICINE

## 2025-03-12 PROCEDURE — 99214 OFFICE O/P EST MOD 30 MIN: CPT | Performed by: INTERNAL MEDICINE

## 2025-03-12 PROCEDURE — 1170F FXNL STATUS ASSESSED: CPT | Performed by: INTERNAL MEDICINE

## 2025-03-12 RX ORDER — METFORMIN HYDROCHLORIDE 500 MG/1
500 TABLET, EXTENDED RELEASE ORAL EVERY 12 HOURS
Qty: 180 TABLET | Refills: 1 | Status: SHIPPED | OUTPATIENT
Start: 2025-03-12

## 2025-03-12 RX ORDER — FUROSEMIDE 20 MG/1
20 TABLET ORAL DAILY
Qty: 90 TABLET | Refills: 1 | Status: SHIPPED | OUTPATIENT
Start: 2025-03-12

## 2025-03-12 RX ORDER — GLIPIZIDE 5 MG/1
5 TABLET ORAL
Qty: 180 TABLET | Refills: 1 | Status: SHIPPED | OUTPATIENT
Start: 2025-03-12

## 2025-03-12 RX ORDER — LOSARTAN POTASSIUM 50 MG/1
50 TABLET ORAL DAILY
Qty: 30 TABLET | Refills: 11 | Status: SHIPPED | OUTPATIENT
Start: 2025-03-12 | End: 2026-03-12

## 2025-03-12 RX ORDER — LOVASTATIN 20 MG/1
20 TABLET ORAL
Qty: 90 TABLET | Refills: 1 | Status: SHIPPED | OUTPATIENT
Start: 2025-03-12

## 2025-03-12 RX ORDER — ALLOPURINOL 100 MG/1
100 TABLET ORAL 3 TIMES DAILY
Qty: 270 TABLET | Refills: 1 | Status: SHIPPED | OUTPATIENT
Start: 2025-03-12

## 2025-03-12 RX ORDER — FERROUS SULFATE 325(65) MG
325 TABLET, DELAYED RELEASE (ENTERIC COATED) ORAL
Qty: 90 TABLET | Refills: 1 | Status: SHIPPED | OUTPATIENT
Start: 2025-03-12 | End: 2026-03-12

## 2025-03-12 ASSESSMENT — ENCOUNTER SYMPTOMS
HYPERTENSION: 1
ABDOMINAL PAIN: 0
SORE THROAT: 0
BRUISES/BLEEDS EASILY: 0
FEVER: 0
BLOOD IN STOOL: 0
WHEEZING: 0
UNEXPECTED WEIGHT CHANGE: 0
DIZZINESS: 0
PALPITATIONS: 0
DIFFICULTY URINATING: 0
ARTHRALGIAS: 0
DIARRHEA: 0
HEADACHES: 0
COUGH: 0
FATIGUE: 0
SINUS PAIN: 0

## 2025-03-12 ASSESSMENT — PATIENT HEALTH QUESTIONNAIRE - PHQ9
2. FEELING DOWN, DEPRESSED OR HOPELESS: NOT AT ALL
1. LITTLE INTEREST OR PLEASURE IN DOING THINGS: NOT AT ALL
SUM OF ALL RESPONSES TO PHQ9 QUESTIONS 1 AND 2: 0

## 2025-03-12 ASSESSMENT — ACTIVITIES OF DAILY LIVING (ADL)
GROCERY_SHOPPING: INDEPENDENT
DOING_HOUSEWORK: INDEPENDENT
DRESSING: INDEPENDENT
BATHING: INDEPENDENT
MANAGING_FINANCES: INDEPENDENT
TAKING_MEDICATION: INDEPENDENT

## 2025-03-12 NOTE — ASSESSMENT & PLAN NOTE
Orders:    glipiZIDE (Glucotrol) 5 mg tablet; Take 1 tablet (5 mg) by mouth 2 times daily (morning and late afternoon).    metFORMIN  mg 24 hr tablet; Take 1 tablet (500 mg) by mouth every 12 hours. Take with food    Albumin-Creatinine Ratio, Urine Random; Future    Hemoglobin A1C; Future

## 2025-03-12 NOTE — PROGRESS NOTES
"Subjective   Patient ID: Hali Christianson is a 80 y.o. female who presents for Medicare Annual Wellness Visit Subsequent, Gout (worsening), Sciatica (Bilateral leg pain, getting worse), and Hypertension.    HPI       Review of Systems    Objective   Lab Results   Component Value Date    HGBA1C 6.6 (A) 09/18/2024      BP (!) 184/86   Pulse 71   Temp 35.9 °C (96.7 °F)   Ht 1.6 m (5' 3\")   Wt 54.3 kg (119 lb 12.8 oz)   SpO2 90%   BMI 21.22 kg/m²     Physical Exam    Assessment/Plan   Hali was seen today for medicare annual wellness visit subsequent, gout, sciatica and hypertension.  Diagnoses and all orders for this visit:  Iron deficiency anemia, unspecified iron deficiency anemia type  Gout, unspecified  Benign essential hypertension  Type 2 diabetes mellitus with other specified complication, unspecified whether long term insulin use (Multi)  Hyperlipidemia, unspecified hyperlipidemia type     "

## 2025-03-12 NOTE — ASSESSMENT & PLAN NOTE
Orders:    lovastatin (Mevacor) 20 mg tablet; Take 1 tablet (20 mg) by mouth once daily in the evening. Take with meals.

## 2025-03-12 NOTE — TELEPHONE ENCOUNTER
Pt would like a pharmacy referral for the Breztri she stated that the cost is to much for her to pay      Thank you!

## 2025-03-12 NOTE — ASSESSMENT & PLAN NOTE
Orders:    furosemide (Lasix) 20 mg tablet; Take 1 tablet (20 mg) by mouth once daily.    CBC and Auto Differential; Future    losartan (Cozaar) 50 mg tablet; Take 1 tablet (50 mg) by mouth once daily.

## 2025-03-12 NOTE — PROGRESS NOTES
Subjective   Reason for Visit: Hali Christianson is an 80 y.o. female here for a Medicare Wellness visit.     Past Medical, Surgical, and Family History reviewed and updated in chart.    Reviewed all medications by prescribing practitioner or clinical pharmacist (such as prescriptions, OTCs, herbal therapies and supplements) and documented in the medical record.    Medicare Annual Wellness Visit Subsequent, Gout (worsening), Sciatica (Bilateral leg pain, getting worse), and Hypertension  Annual Medicare physical and preventative visit  - Needs a screening mammogram  - Needs bone density  - Screen for colon cancer obtained no need to repeat  - Screen for depression negative  - Advance of directive reviewed  - Needs complete blood work    Follow-up  - Uncontrolled hypertension patient need to resume losartan 50 mg daily recheck patient blood pressure in 4 weeks continue low-salt diet  - Diabetes controlled hemoglobin A1c 6.6 continue low-carb diet exercise  Follow-up hemoglobin A1c albumin urine spot test  - Chronic gout controlled continues allopurinol 3 times a day follow-up uric acid  - Chronic hypoxemia and respiratory failure continues doing 2-hour continuous oxygen 2 L p compensated  - Disability placard provided  - Anemia compensated need to continue with iron 324 mg daily follow-up lab results  -Hypokalemia patient unable to swallow current potassium tablet switch patient to potassium ER 10 mg twice a day new prescription provided  -Chronic gout patient seen by DR DEMARCO on allopurinol allopurinol again doing much better  -Chronic renal insufficiency continue with current medication refer patient to nephrology DR DEMARCO  -Chronic respiratory failure continues 2 L oxygen  - Peripheral vascular disease symptomatic follow-up and referral to vascular surgery today  - Lung nodule continue monitoring by pulmonary  Follow-up 4 weeks         Neuropathy      Hypertension  Pertinent negatives include no chest pain,  "headaches or palpitations.       Patient Care Team:  Alvin Davis MD as PCP - General  Alvin Davis MD as PCP - [employer] PCP     Review of Systems   Constitutional:  Negative for fatigue, fever and unexpected weight change.   HENT:  Negative for congestion, ear discharge, ear pain, mouth sores, sinus pain and sore throat.    Eyes:  Negative for visual disturbance.   Respiratory:  Negative for cough and wheezing.    Cardiovascular:  Negative for chest pain, palpitations and leg swelling.   Gastrointestinal:  Negative for abdominal pain, blood in stool and diarrhea.   Genitourinary:  Negative for difficulty urinating.   Musculoskeletal:  Negative for arthralgias.   Skin:  Negative for rash.   Neurological:  Negative for dizziness and headaches.   Hematological:  Does not bruise/bleed easily.   Psychiatric/Behavioral:  Negative for behavioral problems.    All other systems reviewed and are negative.      Objective   Vitals:  BP (!) 184/86   Pulse 71   Temp 35.9 °C (96.7 °F)   Ht 1.6 m (5' 3\")   Wt 54.3 kg (119 lb 12.8 oz)   SpO2 90%   BMI 21.22 kg/m²     Lab Results   Component Value Date    WBC 8.5 10/30/2024    HGB 15.5 10/30/2024    HCT 46.7 (H) 10/30/2024     10/30/2024    CHOL 160 05/30/2024    TRIG 125 05/30/2024    HDL 48.9 05/30/2024    ALT 9 10/30/2024    AST 18 10/30/2024     10/30/2024    K 3.2 (L) 10/30/2024     10/30/2024    CREATININE 0.98 10/30/2024    BUN 26 (H) 10/30/2024    CO2 28 10/30/2024    TSH 1.44 05/30/2024    HGBA1C 6.6 (A) 09/18/2024     par   Physical Exam  Vitals and nursing note reviewed.   Constitutional:       Appearance: Normal appearance.   HENT:      Head: Normocephalic.      Nose: Nose normal.   Eyes:      Conjunctiva/sclera: Conjunctivae normal.      Pupils: Pupils are equal, round, and reactive to light.   Cardiovascular:      Rate and Rhythm: Regular rhythm.   Pulmonary:      Effort: Pulmonary effort is normal.      Breath " sounds: Normal breath sounds.   Abdominal:      General: Abdomen is flat.      Palpations: Abdomen is soft.   Musculoskeletal:      Cervical back: Neck supple.   Skin:     General: Skin is warm.   Neurological:      General: No focal deficit present.      Mental Status: She is oriented to person, place, and time.   Psychiatric:         Mood and Affect: Mood normal.         Assessment & Plan  Iron deficiency anemia, unspecified iron deficiency anemia type    Orders:    ferrous sulfate 325 (65 Fe) mg EC tablet; Take 1 tablet by mouth once daily with breakfast. Do not crush, chew, or split.    Gout, unspecified    Orders:    allopurinol (Zyloprim) 100 mg tablet; Take 1 tablet (100 mg) by mouth 3 times a day.    Benign essential hypertension    Orders:    furosemide (Lasix) 20 mg tablet; Take 1 tablet (20 mg) by mouth once daily.    CBC and Auto Differential; Future    losartan (Cozaar) 50 mg tablet; Take 1 tablet (50 mg) by mouth once daily.    Type 2 diabetes mellitus with other specified complication, unspecified whether long term insulin use (Multi)    Orders:    glipiZIDE (Glucotrol) 5 mg tablet; Take 1 tablet (5 mg) by mouth 2 times daily (morning and late afternoon).    metFORMIN  mg 24 hr tablet; Take 1 tablet (500 mg) by mouth every 12 hours. Take with food    Albumin-Creatinine Ratio, Urine Random; Future    Hemoglobin A1C; Future    Hyperlipidemia, unspecified hyperlipidemia type    Orders:    lovastatin (Mevacor) 20 mg tablet; Take 1 tablet (20 mg) by mouth once daily in the evening. Take with meals.    Routine general medical examination at health care facility    Orders:    1 Year Follow Up In Primary Care - Wellness Exam; Future    Comprehensive Metabolic Panel; Future    Lipid Panel; Future    Magnesium; Future    TSH with reflex to Free T4 if abnormal; Future    Encounter for screening mammogram for malignant neoplasm of breast    Orders:    BI mammo bilateral screening tomosynthesis;  Future    Menopause    Orders:    XR DEXA bone density; Future    Hypertension, unspecified type         Type 2 diabetes mellitus with other specified complication, with long-term current use of insulin         Gout, unspecified cause, unspecified chronicity, unspecified site    Orders:    Uric Acid; Future    Hypokalemia         Vitamin D deficiency    Orders:    Vitamin D 25-Hydroxy,Total (for eval of Vitamin D levels); Future    Rheumatoid arthritis of multiple sites with negative rheumatoid factor (Multi)         Respiratory failure with hypoxia, unspecified chronicity (Multi)         Heart failure, unspecified HF chronicity, unspecified heart failure type         Anemia in stage 3a chronic kidney disease (Multi)          Medicare Annual Wellness Visit Subsequent, Gout (worsening), Sciatica (Bilateral leg pain, getting worse), and Hypertension  Annual Medicare physical and preventative visit  - Needs a screening mammogram  - Needs bone density  - Screen for colon cancer obtained no need to repeat  - Screen for depression negative  - Advance of directive reviewed  - Needs complete blood work    Follow-up  - Uncontrolled hypertension patient need to resume losartan 50 mg daily recheck patient blood pressure in 4 weeks continue low-salt diet  - Diabetes controlled hemoglobin A1c 6.6 continue low-carb diet exercise  Follow-up hemoglobin A1c albumin urine spot test  - Chronic gout controlled continues allopurinol 3 times a day follow-up uric acid  - Chronic hypoxemia and respiratory failure continues doing 2-hour continuous oxygen 2 L p compensated  - Disability placard provided  - Anemia compensated need to continue with iron 324 mg daily follow-up lab results  -Hypokalemia patient unable to swallow current potassium tablet switch patient to potassium ER 10 mg twice a day new prescription provided  -Chronic gout patient seen by DR DEMARCO on allopurinol allopurinol again doing much better  -Chronic renal  insufficiency continue with current medication refer patient to nephrology DR DEMARCO  -Chronic respiratory failure continues 2 L oxygen  - Peripheral vascular disease symptomatic follow-up and referral to vascular surgery today  - Lung nodule continue monitoring by pulmonary  Follow-up 4 weeks

## 2025-03-17 ENCOUNTER — APPOINTMENT (OUTPATIENT)
Dept: PRIMARY CARE | Facility: CLINIC | Age: 80
End: 2025-03-17
Payer: MEDICARE

## 2025-03-26 ENCOUNTER — APPOINTMENT (OUTPATIENT)
Dept: PHARMACY | Facility: HOSPITAL | Age: 80
End: 2025-03-26
Payer: MEDICARE

## 2025-03-26 ENCOUNTER — TELEPHONE (OUTPATIENT)
Dept: PULMONOLOGY | Facility: CLINIC | Age: 80
End: 2025-03-26

## 2025-03-26 DIAGNOSIS — J44.9 CHRONIC OBSTRUCTIVE PULMONARY DISEASE, UNSPECIFIED COPD TYPE (MULTI): Primary | ICD-10-CM

## 2025-03-26 NOTE — TELEPHONE ENCOUNTER
Pt called and said that she is frustrated with [employer] and that she would like to switch DME companies she wants to go back to [facility] in [place] I told the pt that I would call her back once the order was written and sent to [facility]    Thank you!

## 2025-03-28 ENCOUNTER — APPOINTMENT (OUTPATIENT)
Dept: PHARMACY | Facility: HOSPITAL | Age: 80
End: 2025-03-28
Payer: MEDICARE

## 2025-03-28 DIAGNOSIS — J44.9 CHRONIC OBSTRUCTIVE PULMONARY DISEASE, UNSPECIFIED COPD TYPE (MULTI): ICD-10-CM

## 2025-03-28 NOTE — PROGRESS NOTES
"  Pharmacist Clinic: Pulmonary Management    Hali Christianson is a 80 y.o. female was referred to Clinical Pharmacy Team for Pulmonary assessment.   Referring Provider: Olvin Cui, *  Last visit: 2/6/25  Next visit: 8/7/25    Subjective   Allergies   Allergen Reactions    Haloperidol Hives, Nausea And Vomiting and Other    Darvon [Propoxyphene] Unknown    Dilaudid [Hydromorphone] Unknown    Gabapentin Other     GI upset. Slept for 3 days     Losartan-Hydrochlorothiazide Other    Morphine Unknown    Nsaids (Non-Steroidal Anti-Inflammatory Drug) Other    Penicillins Unknown    Tegretol [Carbamazepine] Unknown    Toradol [Ketorolac] Unknown    Tramadol Unknown    Propoxyphene N-Acetaminophen Other    Salicylates Unknown       HPI    PULMONARY ASSESSMENT  Patient has been diagnosed with: COPD    Current Regimen:  Breztri  Albuterol HFA  Albuterol Nebs    Symptom Management:  Current symptoms: dyspnea  Triggers: exertion such as cleaning  Alleviating factors: rescue treatment    Rescue Inhaler Use:  How often do you use your rescue inhaler? Daily on exertion    Appropriate Inhaler Technique: yes    Smoking history  - She quit smoking approximately  27  years ago.      Objective   There were no vitals taken for this visit.    Secondary Prevention (vaccines):  -Influenza: Date [Recommended]  -PCV20: Date [2023]  -COVID: Date [2024]  -RSV: Date [Recommended]  -TDAP: Date [Recommended]  -Shingrix: Date [Recommended]    Pulmonary Functions Testing Results:  No results found for: \"FEV1\", \"FVC\", \"BSK3WIG\", \"TLC\", \"DLCO\"    Lab Review  Lab Results   Component Value Date    BILITOT 0.9 10/30/2024    CALCIUM 9.5 10/30/2024    CO2 28 10/30/2024     10/30/2024    CREATININE 0.98 10/30/2024    GLUCOSE 110 (H) 10/30/2024    ALKPHOS 62 10/30/2024    K 3.2 (L) 10/30/2024    PROT 6.9 10/30/2024     10/30/2024    AST 18 10/30/2024    ALT 9 10/30/2024    BUN 26 (H) 10/30/2024    ANIONGAP 13 10/30/2024    MG 2.03 " 09/27/2023    PHOS 2.5 09/27/2023    ALBUMIN 4.4 10/30/2024    LIPASE 28 10/30/2024    GFRF 65 09/27/2023     Hemoglobin   Date Value Ref Range Status   10/30/2024 15.5 12.0 - 16.0 g/dL Final     WBC   Date Value Ref Range Status   10/30/2024 8.5 4.4 - 11.3 x10*3/uL Final     Platelet Estimate   Date Value Ref Range Status   01/07/2023 ADEQUATE  Final     Platelets   Date Value Ref Range Status   10/30/2024 217 150 - 450 x10*3/uL Final       The ASCVD Risk score (Dany DK, et al., 2019) failed to calculate for the following reasons:    The 2019 ASCVD risk score is only valid for ages 40 to 79    Current Outpatient Medications   Medication Instructions    albuterol 90 mcg/actuation inhaler 2 puffs, inhalation, Every 4 hours PRN    albuterol 2.5 mg, nebulization, Every 4 hours PRN, Use every 4-6 hours as needed for wheezing    allopurinol (ZYLOPRIM) 100 mg, oral, 3 times daily    aspirin 81 mg, Daily    blood sugar diagnostic (Prodigy No Coding) strip 1 strip, miscellaneous, 2 times daily, In vitro strip test twice daily    budesonide-glycopyr-formoterol (Breztri Aerosphere) 160-9-4.8 mcg/actuation HFA aerosol inhaler 2 puffs, inhalation, 2 times daily    cholecalciferol (Vitamin D-3) 125 MCG (5000 UT) capsule 1 capsule, Once Weekly    EPINEPHrine (EPIPEN) 0.3 mg, intramuscular, Once as needed, Inject into upper leg. Call 911 after use.    ferrous sulfate 325 (65 Fe) mg EC tablet 1 tablet, oral, Daily with breakfast, Do not crush, chew, or split.    furosemide (LASIX) 20 mg, oral, Daily    glipiZIDE (GLUCOTROL) 5 mg, oral, 2 times daily (morning and late afternoon)    lancets misc Test twice daily    lidocaine 4 % patch As needed    losartan (COZAAR) 50 mg, oral, Daily    lovastatin (MEVACOR) 20 mg, oral, Daily with evening meal    metFORMIN XR (GLUCOPHAGE-XR) 500 mg, oral, Every 12 hours, Take with food    multivit with calcium,iron,min (MULTIPLE VITAMIN, WOMENS ORAL) 1 tablet, Daily    oxygen (O2) gas therapy 1  each, Continuous    potassium chloride CR (Klor-Con) 10 mEq ER tablet 10 mEq, oral, 2 times daily, Do not crush, chew, or split.       Drug Interactions:  No issues identified    Affordability/Accessibility:  Struggles to afford medication cost due to fixed income    Assessment/Plan   Problem List Items Addressed This Visit       COPD (chronic obstructive pulmonary disease) (Multi)     Continue current therapy.   OK to reach out to son to collect financial information. Patient plans to mail information to this [facility]. Alternate contact Boyd to assist with financial information delivery; [email or web address] to specify mailing address.   Follow up PRN for continued enrollment.      Patient Assistance Screening (VAF)    Patient verbally reports monthly or yearly income which is less than 400% federal poverty level     Application for program has been submitted for the following medications: Breztri    Patient has been informed that program team will be reaching out to them to discuss necessary documentation, instructed to answer phone/return voicemail.     Patient aware this process may take up to 6 weeks.     If approved medication must be filled through [facility] pharmacy and may be picked up or mailed to patient.       Salvador Brown [facility]    Continue all meds under the continuation of care with the referring provider and clinical pharmacy team.    Verbal consent to manage patient's drug therapy was obtained from the patient. They were informed they may decline to participate or withdraw from participation in pharmacy services at any time.

## 2025-04-08 ENCOUNTER — HOSPITAL ENCOUNTER (OUTPATIENT)
Dept: RADIOLOGY | Facility: HOSPITAL | Age: 80
Discharge: HOME | End: 2025-04-08
Payer: MEDICARE

## 2025-04-08 ENCOUNTER — APPOINTMENT (OUTPATIENT)
Dept: PRIMARY CARE | Facility: CLINIC | Age: 80
End: 2025-04-08
Payer: MEDICARE

## 2025-04-08 VITALS
BODY MASS INDEX: 21.33 KG/M2 | OXYGEN SATURATION: 95 % | HEART RATE: 72 BPM | TEMPERATURE: 97.1 F | SYSTOLIC BLOOD PRESSURE: 110 MMHG | WEIGHT: 120.4 LBS | DIASTOLIC BLOOD PRESSURE: 70 MMHG

## 2025-04-08 DIAGNOSIS — E11.69 TYPE 2 DIABETES MELLITUS WITH OTHER SPECIFIED COMPLICATION, UNSPECIFIED WHETHER LONG TERM INSULIN USE (MULTI): ICD-10-CM

## 2025-04-08 DIAGNOSIS — J47.1 BRONCHIECTASIS WITH ACUTE EXACERBATION (MULTI): Primary | ICD-10-CM

## 2025-04-08 PROCEDURE — G2211 COMPLEX E/M VISIT ADD ON: HCPCS | Performed by: INTERNAL MEDICINE

## 2025-04-08 PROCEDURE — 3074F SYST BP LT 130 MM HG: CPT | Performed by: INTERNAL MEDICINE

## 2025-04-08 PROCEDURE — 99214 OFFICE O/P EST MOD 30 MIN: CPT | Performed by: INTERNAL MEDICINE

## 2025-04-08 PROCEDURE — 1159F MED LIST DOCD IN RCRD: CPT | Performed by: INTERNAL MEDICINE

## 2025-04-08 PROCEDURE — 71046 X-RAY EXAM CHEST 2 VIEWS: CPT

## 2025-04-08 PROCEDURE — 1160F RVW MEDS BY RX/DR IN RCRD: CPT | Performed by: INTERNAL MEDICINE

## 2025-04-08 PROCEDURE — 1036F TOBACCO NON-USER: CPT | Performed by: INTERNAL MEDICINE

## 2025-04-08 PROCEDURE — 3078F DIAST BP <80 MM HG: CPT | Performed by: INTERNAL MEDICINE

## 2025-04-08 RX ORDER — DOXYCYCLINE 100 MG/1
100 CAPSULE ORAL 2 TIMES DAILY
Qty: 20 CAPSULE | Refills: 0 | Status: SHIPPED | OUTPATIENT
Start: 2025-04-08 | End: 2025-04-18

## 2025-04-08 ASSESSMENT — ENCOUNTER SYMPTOMS
DIZZINESS: 0
ARTHRALGIAS: 0
FATIGUE: 0
SINUS PAIN: 0
HYPERTENSION: 1
UNEXPECTED WEIGHT CHANGE: 0
DIARRHEA: 0
FEVER: 0
ABDOMINAL PAIN: 0
PALPITATIONS: 0
WHEEZING: 0
DIFFICULTY URINATING: 0
BRUISES/BLEEDS EASILY: 0
BLOOD IN STOOL: 0
HEADACHES: 0
SORE THROAT: 0
COUGH: 1

## 2025-04-08 NOTE — PROGRESS NOTES
Subjective   Patient ID: Hali Christianson is a 80 y.o. female who presents for Diabetes, Hyperlipidemia, and Hypertension.    - Recent blood work and plan of care reviewed  - Patient will need to proceed with blood work as recommended  - Cough congestion for the last 2 weeks  Obtain x-ray today  Start empirically on doxycycline twice daily for 10 days  -Hypertension improved to continue losartan 50 mg daily  -- Diabetes controlled hemoglobin A1c 6.6 continue low-carb diet exercise  Follow-up hemoglobin A1c albumin urine spot test  - Chronic gout controlled continues allopurinol 3 times a day follow-up uric acid  - Chronic hypoxemia and respiratory failure continues doing 2-hour continuous oxygen 2 L p compensated  - Disability placard provided  - Anemia compensated need to continue with iron 324 mg daily follow-up lab results  -Hypokalemia patient unable to swallow current potassium tablet switch patient to potassium ER 10 mg twice a day new prescription provided  -Chronic gout patient seen by DR DEMARCO on allopurinol allopurinol again doing much better  -Chronic renal insufficiency continue with current medication refer patient to nephrology DR DEMARCO  -Chronic respiratory failure continues 2 L oxygen  - Peripheral vascular disease symptomatic follow-up and referral to vascular surgery today  - Lung nodule continue monitoring by pulmonary  Follow-up 3 months      Diabetes  Pertinent negatives for hypoglycemia include no dizziness or headaches. Pertinent negatives for diabetes include no chest pain and no fatigue.   Hyperlipidemia  Pertinent negatives include no chest pain.   Hypertension  Pertinent negatives include no chest pain, headaches or palpitations.          Review of Systems   Constitutional:  Negative for fatigue, fever and unexpected weight change.   HENT:  Negative for congestion, ear discharge, ear pain, mouth sores, sinus pain and sore throat.    Eyes:  Negative for visual disturbance.    Respiratory:  Positive for cough. Negative for wheezing.    Cardiovascular:  Negative for chest pain, palpitations and leg swelling.   Gastrointestinal:  Negative for abdominal pain, blood in stool and diarrhea.   Genitourinary:  Negative for difficulty urinating.   Musculoskeletal:  Negative for arthralgias.   Skin:  Negative for rash.   Neurological:  Negative for dizziness and headaches.   Hematological:  Does not bruise/bleed easily.   Psychiatric/Behavioral:  Negative for behavioral problems.    All other systems reviewed and are negative.      Objective   Lab Results   Component Value Date    HGBA1C 6.6 (A) 09/18/2024      /70   Pulse 72   Temp 36.2 °C (97.1 °F)   Wt 54.6 kg (120 lb 6.4 oz)   SpO2 95%   BMI 21.33 kg/m²   Lab Results   Component Value Date    WBC 8.5 10/30/2024    HGB 15.5 10/30/2024    HCT 46.7 (H) 10/30/2024     10/30/2024    CHOL 160 05/30/2024    TRIG 125 05/30/2024    HDL 48.9 05/30/2024    ALT 9 10/30/2024    AST 18 10/30/2024     10/30/2024    K 3.2 (L) 10/30/2024     10/30/2024    CREATININE 0.98 10/30/2024    BUN 26 (H) 10/30/2024    CO2 28 10/30/2024    TSH 1.44 05/30/2024    HGBA1C 6.6 (A) 09/18/2024     par   Physical Exam  Vitals and nursing note reviewed.   Constitutional:       Appearance: Normal appearance.   HENT:      Head: Normocephalic.      Nose: Nose normal.   Eyes:      Conjunctiva/sclera: Conjunctivae normal.      Pupils: Pupils are equal, round, and reactive to light.   Cardiovascular:      Rate and Rhythm: Regular rhythm.   Pulmonary:      Effort: Pulmonary effort is normal.      Breath sounds: Normal breath sounds.   Abdominal:      General: Abdomen is flat.      Palpations: Abdomen is soft.   Musculoskeletal:      Cervical back: Neck supple.   Skin:     General: Skin is warm.   Neurological:      General: No focal deficit present.      Mental Status: She is oriented to person, place, and time.   Psychiatric:         Mood and Affect:  Mood normal.         Assessment/Plan   Hali was seen today for diabetes, hyperlipidemia and hypertension.  Diagnoses and all orders for this visit:  Bronchiectasis with acute exacerbation (Multi) (Primary)  -     doxycycline (Vibramycin) 100 mg capsule; Take 1 capsule (100 mg) by mouth 2 times a day for 10 days. Take with at least 8 ounces (large glass) of water, do not lie down for 30 minutes after  Type 2 diabetes mellitus with other specified complication, unspecified whether long term insulin use (Multi)  -     XR chest 2 views; Future  Other orders  -     Follow Up In Primary Care - Established  -     Follow Up In Primary Care - Established; Future   - Recent blood work and plan of care reviewed  - Patient will need to proceed with blood work as recommended  - Cough congestion for the last 2 weeks  Obtain x-ray today  Start empirically on doxycycline twice daily for 10 days  -Hypertension improved to continue losartan 50 mg daily  -- Diabetes controlled hemoglobin A1c 6.6 continue low-carb diet exercise  Follow-up hemoglobin A1c albumin urine spot test  - Chronic gout controlled continues allopurinol 3 times a day follow-up uric acid  - Chronic hypoxemia and respiratory failure continues doing 2-hour continuous oxygen 2 L p compensated  - Disability placard provided  - Anemia compensated need to continue with iron 324 mg daily follow-up lab results  -Hypokalemia patient unable to swallow current potassium tablet switch patient to potassium ER 10 mg twice a day new prescription provided  -Chronic gout patient seen by DR DEMARCO on allopurinol allopurinol again doing much better  -Chronic renal insufficiency continue with current medication refer patient to nephrology DR DEMARCO  -Chronic respiratory failure continues 2 L oxygen  - Peripheral vascular disease symptomatic follow-up and referral to vascular surgery today  - Lung nodule continue monitoring by pulmonary  Follow-up 3 months

## 2025-04-09 LAB
25(OH)D3+25(OH)D2 SERPL-MCNC: 44 NG/ML (ref 30–100)
ALBUMIN SERPL-MCNC: 4.4 G/DL (ref 3.6–5.1)
ALBUMIN/CREAT UR: 23 MG/G CREAT
ALP SERPL-CCNC: 60 U/L (ref 37–153)
ALT SERPL-CCNC: 13 U/L (ref 6–29)
ANION GAP SERPL CALCULATED.4IONS-SCNC: 14 MMOL/L (CALC) (ref 7–17)
AST SERPL-CCNC: 17 U/L (ref 10–35)
BASOPHILS # BLD AUTO: 52 CELLS/UL (ref 0–200)
BASOPHILS NFR BLD AUTO: 0.9 %
BILIRUB SERPL-MCNC: 0.4 MG/DL (ref 0.2–1.2)
BUN SERPL-MCNC: 16 MG/DL (ref 7–25)
CALCIUM SERPL-MCNC: 9.9 MG/DL (ref 8.6–10.4)
CHLORIDE SERPL-SCNC: 106 MMOL/L (ref 98–110)
CHOLEST SERPL-MCNC: 178 MG/DL
CHOLEST/HDLC SERPL: 2.9 (CALC)
CO2 SERPL-SCNC: 22 MMOL/L (ref 20–32)
CREAT SERPL-MCNC: 1.07 MG/DL (ref 0.6–0.95)
CREAT UR-MCNC: 48 MG/DL (ref 20–275)
EGFRCR SERPLBLD CKD-EPI 2021: 52 ML/MIN/1.73M2
EOSINOPHIL # BLD AUTO: 157 CELLS/UL (ref 15–500)
EOSINOPHIL NFR BLD AUTO: 2.7 %
ERYTHROCYTE [DISTWIDTH] IN BLOOD BY AUTOMATED COUNT: 13.8 % (ref 11–15)
EST. AVERAGE GLUCOSE BLD GHB EST-MCNC: 160 MG/DL
EST. AVERAGE GLUCOSE BLD GHB EST-SCNC: 8.9 MMOL/L
GLUCOSE SERPL-MCNC: 140 MG/DL (ref 65–99)
HBA1C MFR BLD: 7.2 % OF TOTAL HGB
HCT VFR BLD AUTO: 48.6 % (ref 35–45)
HDLC SERPL-MCNC: 61 MG/DL
HGB BLD-MCNC: 16 G/DL (ref 11.7–15.5)
LDLC SERPL CALC-MCNC: 92 MG/DL (CALC)
LYMPHOCYTES # BLD AUTO: 1293 CELLS/UL (ref 850–3900)
LYMPHOCYTES NFR BLD AUTO: 22.3 %
MAGNESIUM SERPL-MCNC: 2.5 MG/DL (ref 1.5–2.5)
MCH RBC QN AUTO: 29.9 PG (ref 27–33)
MCHC RBC AUTO-ENTMCNC: 32.9 G/DL (ref 32–36)
MCV RBC AUTO: 90.7 FL (ref 80–100)
MICROALBUMIN UR-MCNC: 1.1 MG/DL
MONOCYTES # BLD AUTO: 406 CELLS/UL (ref 200–950)
MONOCYTES NFR BLD AUTO: 7 %
NEUTROPHILS # BLD AUTO: 3892 CELLS/UL (ref 1500–7800)
NEUTROPHILS NFR BLD AUTO: 67.1 %
NONHDLC SERPL-MCNC: 117 MG/DL (CALC)
PLATELET # BLD AUTO: 277 THOUSAND/UL (ref 140–400)
PMV BLD REES-ECKER: 10 FL (ref 7.5–12.5)
POTASSIUM SERPL-SCNC: 4.3 MMOL/L (ref 3.5–5.3)
PROT SERPL-MCNC: 7.1 G/DL (ref 6.1–8.1)
RBC # BLD AUTO: 5.36 MILLION/UL (ref 3.8–5.1)
SODIUM SERPL-SCNC: 142 MMOL/L (ref 135–146)
TRIGL SERPL-MCNC: 158 MG/DL
TSH SERPL-ACNC: 2.95 MIU/L (ref 0.4–4.5)
URATE SERPL-MCNC: 4.7 MG/DL (ref 2.5–7)
WBC # BLD AUTO: 5.8 THOUSAND/UL (ref 3.8–10.8)

## 2025-07-02 ENCOUNTER — TELEPHONE (OUTPATIENT)
Dept: PHARMACY | Facility: HOSPITAL | Age: 80
End: 2025-07-02
Payer: MEDICARE

## 2025-07-02 NOTE — TELEPHONE ENCOUNTER
Population Health: Outreach by Ambulatory Pharmacy Team    Patient: Hali Christianson  Primary Care Provider (PCP): Alvin Davis MD  Payor: Rex BABIN  Reason: Adherence  Medication(s): Metformin ER 500mg, Losartan 50mg, Lovastatin 20mg  Outcome: Patient Reached: Declines Discussion    CARMELLA THORPE

## 2025-07-05 ENCOUNTER — APPOINTMENT (OUTPATIENT)
Dept: RADIOLOGY | Facility: HOSPITAL | Age: 80
End: 2025-07-05
Payer: MEDICARE

## 2025-07-05 ENCOUNTER — HOSPITAL ENCOUNTER (EMERGENCY)
Facility: HOSPITAL | Age: 80
Discharge: HOME | End: 2025-07-05
Payer: MEDICARE

## 2025-07-05 VITALS
DIASTOLIC BLOOD PRESSURE: 73 MMHG | HEART RATE: 69 BPM | BODY MASS INDEX: 22.86 KG/M2 | OXYGEN SATURATION: 93 % | HEIGHT: 63 IN | SYSTOLIC BLOOD PRESSURE: 176 MMHG | WEIGHT: 129 LBS | TEMPERATURE: 98 F | RESPIRATION RATE: 16 BRPM

## 2025-07-05 DIAGNOSIS — S62.662A CLOSED NONDISPLACED FRACTURE OF DISTAL PHALANX OF RIGHT MIDDLE FINGER, INITIAL ENCOUNTER: ICD-10-CM

## 2025-07-05 DIAGNOSIS — S60.10XA SUBUNGUAL HEMATOMA OF DIGIT OF HAND, INITIAL ENCOUNTER: Primary | ICD-10-CM

## 2025-07-05 PROCEDURE — 2500000001 HC RX 250 WO HCPCS SELF ADMINISTERED DRUGS (ALT 637 FOR MEDICARE OP): Performed by: PHYSICIAN ASSISTANT

## 2025-07-05 PROCEDURE — 11740 EVACUATION SUBUNGUAL HMTMA: CPT | Mod: F7

## 2025-07-05 PROCEDURE — 90471 IMMUNIZATION ADMIN: CPT | Performed by: PHYSICIAN ASSISTANT

## 2025-07-05 PROCEDURE — 73140 X-RAY EXAM OF FINGER(S): CPT | Mod: RIGHT SIDE | Performed by: RADIOLOGY

## 2025-07-05 PROCEDURE — 90715 TDAP VACCINE 7 YRS/> IM: CPT | Performed by: PHYSICIAN ASSISTANT

## 2025-07-05 PROCEDURE — 2500000004 HC RX 250 GENERAL PHARMACY W/ HCPCS (ALT 636 FOR OP/ED): Performed by: PHYSICIAN ASSISTANT

## 2025-07-05 PROCEDURE — 99283 EMERGENCY DEPT VISIT LOW MDM: CPT

## 2025-07-05 PROCEDURE — 73140 X-RAY EXAM OF FINGER(S): CPT | Mod: RT

## 2025-07-05 RX ORDER — OXYCODONE AND ACETAMINOPHEN 5; 325 MG/1; MG/1
1 TABLET ORAL EVERY 12 HOURS PRN
Qty: 6 TABLET | Refills: 0 | Status: SHIPPED | OUTPATIENT
Start: 2025-07-05 | End: 2025-07-08

## 2025-07-05 RX ORDER — CLINDAMYCIN HYDROCHLORIDE 300 MG/1
300 CAPSULE ORAL 3 TIMES DAILY
Qty: 21 CAPSULE | Refills: 0 | Status: SHIPPED | OUTPATIENT
Start: 2025-07-05 | End: 2025-07-15

## 2025-07-05 RX ORDER — OXYCODONE AND ACETAMINOPHEN 5; 325 MG/1; MG/1
1 TABLET ORAL ONCE
Refills: 0 | Status: COMPLETED | OUTPATIENT
Start: 2025-07-05 | End: 2025-07-05

## 2025-07-05 RX ADMIN — TETANUS TOXOID, REDUCED DIPHTHERIA TOXOID AND ACELLULAR PERTUSSIS VACCINE, ADSORBED 0.5 ML: 5; 2.5; 8; 8; 2.5 SUSPENSION INTRAMUSCULAR at 15:52

## 2025-07-05 RX ADMIN — OXYCODONE HYDROCHLORIDE AND ACETAMINOPHEN 1 TABLET: 5; 325 TABLET ORAL at 15:52

## 2025-07-05 ASSESSMENT — PAIN DESCRIPTION - FREQUENCY: FREQUENCY: CONSTANT/CONTINUOUS

## 2025-07-05 ASSESSMENT — PAIN DESCRIPTION - PAIN TYPE: TYPE: ACUTE PAIN

## 2025-07-05 ASSESSMENT — PAIN SCALES - GENERAL
PAINLEVEL_OUTOF10: 10 - WORST POSSIBLE PAIN
PAINLEVEL_OUTOF10: 8

## 2025-07-05 ASSESSMENT — PAIN DESCRIPTION - DESCRIPTORS: DESCRIPTORS: ACHING

## 2025-07-05 ASSESSMENT — PAIN DESCRIPTION - ORIENTATION: ORIENTATION: RIGHT

## 2025-07-05 ASSESSMENT — PAIN - FUNCTIONAL ASSESSMENT: PAIN_FUNCTIONAL_ASSESSMENT: 0-10

## 2025-07-05 ASSESSMENT — PAIN DESCRIPTION - LOCATION: LOCATION: FINGER (COMMENT WHICH ONE)

## 2025-07-05 NOTE — DISCHARGE INSTRUCTIONS
Contact the orthopedic surgeon for further eval and treatment  Take the antibiotic as prescribed if you have increased redness swelling pain you need to get rechecked

## 2025-07-05 NOTE — ED PROVIDER NOTES
"HPI   Chief Complaint   Patient presents with    Hand Pain     Right 3 rd finger crush injury       80-year-old female here with right middle finger injury after she closed it in a car door yesterday afternoon she does have a subungual hematoma and complaints of increasing tenderness to the area with complaints of \"pressure \"              Patient History   Medical History[1]  Surgical History[2]  Family History[3]  Social History[4]    Physical Exam   ED Triage Vitals [07/05/25 1420]   Temperature Heart Rate Respirations BP   37 °C (98.6 °F) 79 14 (!) 184/87      Pulse Ox Temp Source Heart Rate Source Patient Position   (!) 92 % Temporal -- Sitting      BP Location FiO2 (%)     Left arm --       Physical Exam  Vitals and nursing note reviewed.   Constitutional:       General: She is not in acute distress.     Appearance: Normal appearance. She is well-developed.   HENT:      Head: Normocephalic and atraumatic.   Eyes:      Conjunctiva/sclera: Conjunctivae normal.   Cardiovascular:      Rate and Rhythm: Normal rate and regular rhythm.      Heart sounds: No murmur heard.  Pulmonary:      Effort: Pulmonary effort is normal. No respiratory distress.      Breath sounds: Normal breath sounds.   Abdominal:      Palpations: Abdomen is soft.      Tenderness: There is no abdominal tenderness.   Musculoskeletal:         General: No swelling.        Hands:       Cervical back: Neck supple.      Comments: Subungual hematoma with slight bruising extending to the distal tip of the right middle finger  Exquisitely tender with palp   Skin:     Capillary Refill: Capillary refill takes less than 2 seconds.      Findings: Bruising present.      Comments: Ecchymotic with subungual hematoma to the distal tip of the right middle finger  With slight swelling and tenderness from the DIP to the distal tip of the finger     Neurological:      General: No focal deficit present.      Mental Status: She is alert and oriented to person, place, " and time.   Psychiatric:         Mood and Affect: Mood normal.           ED Course & MDM   Diagnoses as of 07/05/25 1532   Subungual hematoma of digit of hand, initial encounter   Closed nondisplaced fracture of distal phalanx of right middle finger, initial encounter                 No data recorded                                 Medical Decision Making  Differential:   1) subungual hematoma   2) finger fracture   3) finger hematoma    80-year-old female here with tenderness and pain to the distal tip of her right middle finger she does note to have a subungual hematoma, with slight bruising also noted to the distal tip of the finger just distal of the DIP, area is slightly swollen with tenderness  Nail trephination performed, to the subungual hematoma with blood expressed, with  improvement of symptoms  X-ray does show fracture to the distal phalanx post trephination ,cleansed with NS than applied  antibiotic ointment nonstick dressing bulky dressing and finger splint placed will refer to orthopedic surgeon for further eval and treatment    Did place on oral antibiotic and referred to specialty orthopedic surgeon     Plan: Discussed differential with the patient and /or parents family   Patient to  follow up with the PCP in the next 2-3 days  Return for any worsening symptoms or go to the ER for further evaluation. Patient/family/caregiver  understands return   precautions and discharge instructions and is agreeable to the current plan   Impression: Traumatic distal right middle finger fracture, subungual hematoma        Orders and Diagnoses for this visit    Labs Reviewed - No data to display  XR fingers right 2+ views   Final Result    Fracture the distal phalanx of the right third finger.    Signed by Tino Sommer MD             Procedure  Procedures       Tierney Holt PA-C  07/05/25 1534       Tierney Holt PA-C  07/05/25 1539         [1]   Past Medical History:  Diagnosis Date    Displaced fracture of  shaft of unspecified metacarpal bone, subsequent encounter for fracture with routine healing 2017    Closed avulsion fracture of shaft of metacarpal bone with routine healing, subsequent encounter    Effusion, left wrist 2017    Wrist swelling, left    Encounter for other screening for malignant neoplasm of breast 2020    Breast cancer screening    Encounter for screening for other viral diseases 2017    Need for hepatitis C screening test    Pain in left shoulder 2017    Shoulder pain, left    Pain in left wrist 08/15/2017    Left wrist pain    Personal history of other diseases of the circulatory system     History of hypertension    Personal history of other diseases of the respiratory system     History of chronic obstructive lung disease    Personal history of other endocrine, nutritional and metabolic disease     History of hyperlipidemia    Personal history of other endocrine, nutritional and metabolic disease 10/04/2022    History of type 2 diabetes mellitus    Unspecified injury of muscle(s) and tendon(s) of the rotator cuff of left shoulder, subsequent encounter 2017    Unspecified injury of muscle(s) and tendon(s) of the rotator cuff of left shoulder, subsequent encounter    Urinary tract infection, site not specified 2019    Acute UTI   [2]   Past Surgical History:  Procedure Laterality Date    HYSTERECTOMY  2016    Hysterectomy    KNEE ARTHROPLASTY  2016    Knee Arthroplasty    TONSILLECTOMY  2016    Tonsillectomy   [3]   Family History  Problem Relation Name Age of Onset    Other (cva) Mother      Asthma Father     [4]   Social History  Tobacco Use    Smoking status: Former     Current packs/day: 0.00     Types: Cigarettes     Quit date:      Years since quittin.5    Smokeless tobacco: Never   Vaping Use    Vaping status: Never Used   Substance Use Topics    Alcohol use: Never    Drug use: Never        Tierney Holt PA-C  25  1537

## 2025-07-08 ENCOUNTER — APPOINTMENT (OUTPATIENT)
Dept: PRIMARY CARE | Facility: CLINIC | Age: 80
End: 2025-07-08
Payer: MEDICARE

## 2025-07-08 VITALS
BODY MASS INDEX: 22.25 KG/M2 | OXYGEN SATURATION: 85 % | HEART RATE: 116 BPM | SYSTOLIC BLOOD PRESSURE: 130 MMHG | TEMPERATURE: 97.4 F | DIASTOLIC BLOOD PRESSURE: 60 MMHG | WEIGHT: 125.6 LBS

## 2025-07-08 DIAGNOSIS — J96.11 CHRONIC HYPOXEMIC RESPIRATORY FAILURE: ICD-10-CM

## 2025-07-08 DIAGNOSIS — J42 CHRONIC BRONCHITIS, UNSPECIFIED CHRONIC BRONCHITIS TYPE (MULTI): ICD-10-CM

## 2025-07-08 DIAGNOSIS — M10.00 IDIOPATHIC GOUT, UNSPECIFIED CHRONICITY, UNSPECIFIED SITE: ICD-10-CM

## 2025-07-08 DIAGNOSIS — E78.00 PURE HYPERCHOLESTEROLEMIA: ICD-10-CM

## 2025-07-08 DIAGNOSIS — S62.662A CLOSED NONDISPLACED FRACTURE OF DISTAL PHALANX OF RIGHT MIDDLE FINGER, INITIAL ENCOUNTER: Primary | ICD-10-CM

## 2025-07-08 DIAGNOSIS — S60.10XA SUBUNGUAL HEMATOMA OF DIGIT OF HAND, INITIAL ENCOUNTER: ICD-10-CM

## 2025-07-08 DIAGNOSIS — S62.639A CLOSED AVULSION FRACTURE OF DISTAL PHALANX OF FINGER, INITIAL ENCOUNTER: ICD-10-CM

## 2025-07-08 PROCEDURE — 1160F RVW MEDS BY RX/DR IN RCRD: CPT | Performed by: INTERNAL MEDICINE

## 2025-07-08 PROCEDURE — 99214 OFFICE O/P EST MOD 30 MIN: CPT | Performed by: INTERNAL MEDICINE

## 2025-07-08 PROCEDURE — 3078F DIAST BP <80 MM HG: CPT | Performed by: INTERNAL MEDICINE

## 2025-07-08 PROCEDURE — 1159F MED LIST DOCD IN RCRD: CPT | Performed by: INTERNAL MEDICINE

## 2025-07-08 PROCEDURE — 3075F SYST BP GE 130 - 139MM HG: CPT | Performed by: INTERNAL MEDICINE

## 2025-07-08 PROCEDURE — 1036F TOBACCO NON-USER: CPT | Performed by: INTERNAL MEDICINE

## 2025-07-08 PROCEDURE — G2211 COMPLEX E/M VISIT ADD ON: HCPCS | Performed by: INTERNAL MEDICINE

## 2025-07-08 RX ORDER — OXYCODONE AND ACETAMINOPHEN 5; 325 MG/1; MG/1
1 TABLET ORAL EVERY 12 HOURS PRN
Qty: 14 TABLET | Refills: 0 | Status: SHIPPED | OUTPATIENT
Start: 2025-07-08 | End: 2025-07-15

## 2025-07-08 ASSESSMENT — ENCOUNTER SYMPTOMS
SORE THROAT: 0
BLOOD IN STOOL: 0
ABDOMINAL PAIN: 0
DIFFICULTY URINATING: 0
UNEXPECTED WEIGHT CHANGE: 0
PALPITATIONS: 0
HYPERTENSION: 1
DIZZINESS: 0
COUGH: 0
SINUS PAIN: 0
FATIGUE: 0
FEVER: 0
DIARRHEA: 0
HEADACHES: 0
BRUISES/BLEEDS EASILY: 0
WHEEZING: 0
ARTHRALGIAS: 0

## 2025-07-08 NOTE — PROGRESS NOTES
Subjective   Patient ID: Hali Christianson is a 80 y.o. female who presents for Diabetes, Hyperlipidemia, Hypertension, Results (BW), and ER Follow-up (Right hand index finger broken, seen ortho but needs pain medication in the meantime ).     Recent blood work and plan of care reviewed  - Recent emergency room records reviewed patient diagnosed with right middle finger fracture after closing car door  Patient given Percocet tolerated the pain every 12 hours given 6 tablets now patient did not sleep from the pain and throbbing pain awaiting to see orthopedic next Monday  -Pain controlled patient may use Tylenol for breakthrough pain  Patient given new prescription for Percocet for 7 days 14 tablets without refill patient tolerating medication without side effect continue monitoring  -[facility] reviewed  Follow-up closely low risk for dependence  -Hypertension improved to continue losartan 50 mg daily  -- Diabetes controlled hemoglobin A1c 6.6 continue low-carb diet exercise  Follow-up hemoglobin A1c albumin urine spot test  - Chronic gout controlled continues allopurinol 3 times a day follow-up uric acid  - Chronic hypoxemia and respiratory failure continues doing 2-hour continuous oxygen 2 L p compensated  - Anemia compensated need to continue with iron 324 mg daily follow-up lab results  -Hypokalemia patient unable to swallow current potassium tablet switch patient to potassium ER 10 mg twice a day new prescription provided  -Chronic gout patient seen by DR DEMARCO on allopurinol allopurinol again doing much better  -Chronic renal insufficiency continue with current medication refer patient to nephrology DR DEMARCO  -Chronic respiratory failure continues 2 L oxygen  - Peripheral vascular disease symptomatic follow-up and referral to vascular surgery today  - Lung nodule continue monitoring by pulmonary  Follow-up 3 months as scheduled      Diabetes  Pertinent negatives for hypoglycemia include no dizziness or  headaches. Pertinent negatives for diabetes include no chest pain and no fatigue.   Hyperlipidemia  Pertinent negatives include no chest pain.   Hypertension  Pertinent negatives include no chest pain, headaches or palpitations.   ER Follow-up  Pertinent negatives include no abdominal pain, arthralgias, chest pain, congestion, coughing, fatigue, fever, headaches, rash or sore throat.          Review of Systems   Constitutional:  Negative for fatigue, fever and unexpected weight change.   HENT:  Negative for congestion, ear discharge, ear pain, mouth sores, sinus pain and sore throat.    Eyes:  Negative for visual disturbance.   Respiratory:  Negative for cough and wheezing.    Cardiovascular:  Negative for chest pain, palpitations and leg swelling.   Gastrointestinal:  Negative for abdominal pain, blood in stool and diarrhea.   Genitourinary:  Negative for difficulty urinating.   Musculoskeletal:  Negative for arthralgias.        Right middle finger fracture   Skin:  Negative for rash.   Neurological:  Negative for dizziness and headaches.   Hematological:  Does not bruise/bleed easily.   Psychiatric/Behavioral:  Negative for behavioral problems.    All other systems reviewed and are negative.      Objective   Lab Results   Component Value Date    HGBA1C 7.2 (H) 04/08/2025      /60   Pulse (!) 116   Temp 36.3 °C (97.4 °F)   Wt 57 kg (125 lb 9.6 oz)   SpO2 (!) 85%   BMI 22.25 kg/m²   Lab Results   Component Value Date    WBC 5.8 04/08/2025    HGB 16.0 (H) 04/08/2025    HCT 48.6 (H) 04/08/2025     04/08/2025    CHOL 178 04/08/2025    TRIG 158 (H) 04/08/2025    HDL 61 04/08/2025    ALT 13 04/08/2025    AST 17 04/08/2025     04/08/2025    K 4.3 04/08/2025     04/08/2025    CREATININE 1.07 (H) 04/08/2025    BUN 16 04/08/2025    CO2 22 04/08/2025    TSH 2.95 04/08/2025    HGBA1C 7.2 (H) 04/08/2025     par   Physical Exam  Vitals and nursing note reviewed.   Constitutional:       Appearance:  Normal appearance.   HENT:      Head: Normocephalic.      Nose: Nose normal.   Eyes:      Conjunctiva/sclera: Conjunctivae normal.      Pupils: Pupils are equal, round, and reactive to light.   Cardiovascular:      Rate and Rhythm: Regular rhythm.   Pulmonary:      Effort: Pulmonary effort is normal.      Breath sounds: Normal breath sounds.   Abdominal:      General: Abdomen is flat.      Palpations: Abdomen is soft.   Musculoskeletal:         General: Tenderness (Right middle finger fracture and surgical dressing and support) present.      Cervical back: Neck supple.   Skin:     General: Skin is warm.   Neurological:      General: No focal deficit present.      Mental Status: She is oriented to person, place, and time.   Psychiatric:         Mood and Affect: Mood normal.         Assessment/Plan   Hali was seen today for diabetes, hyperlipidemia, hypertension, results and er follow-up.  Diagnoses and all orders for this visit:  Closed nondisplaced fracture of distal phalanx of right middle finger, initial encounter (Primary)  -     oxyCODONE-acetaminophen (Percocet) 5-325 mg tablet; Take 1 tablet by mouth every 12 hours if needed (for pain) for up to 7 days.  Subungual hematoma of digit of hand, initial encounter  -     oxyCODONE-acetaminophen (Percocet) 5-325 mg tablet; Take 1 tablet by mouth every 12 hours if needed (for pain) for up to 7 days.  Closed avulsion fracture of distal phalanx of finger, initial encounter  -     oxyCODONE-acetaminophen (Percocet) 5-325 mg tablet; Take 1 tablet by mouth every 12 hours if needed (for pain) for up to 7 days.  Pure hypercholesterolemia  Chronic bronchitis, unspecified chronic bronchitis type (Multi)  Idiopathic gout, unspecified chronicity, unspecified site  Chronic hypoxemic respiratory failure  Other orders  -     Follow Up In Primary Care - Established   Recent blood work and plan of care reviewed  - Recent emergency room records reviewed patient diagnosed with right  middle finger fracture after closing car door  Patient given Percocet tolerated the pain every 12 hours given 6 tablets now patient did not sleep from the pain and throbbing pain awaiting to see orthopedic next Monday  -Pain controlled patient may use Tylenol for breakthrough pain  Patient given new prescription for Percocet for 7 days 14 tablets without refill patient tolerating medication without side effect continue monitoring  -[facility] reviewed  Follow-up closely low risk for dependence  -Hypertension improved to continue losartan 50 mg daily  -- Diabetes controlled hemoglobin A1c 6.6 continue low-carb diet exercise  Follow-up hemoglobin A1c albumin urine spot test  - Chronic gout controlled continues allopurinol 3 times a day follow-up uric acid  - Chronic hypoxemia and respiratory failure continues doing 2-hour continuous oxygen 2 L p compensated  - Anemia compensated need to continue with iron 324 mg daily follow-up lab results  -Hypokalemia patient unable to swallow current potassium tablet switch patient to potassium ER 10 mg twice a day new prescription provided  -Chronic gout patient seen by DR DEMARCO on allopurinol allopurinol again doing much better  -Chronic renal insufficiency continue with current medication refer patient to nephrology DR DEMARCO  -Chronic respiratory failure continues 2 L oxygen  - Peripheral vascular disease symptomatic follow-up and referral to vascular surgery today  - Lung nodule continue monitoring by pulmonary  Follow-up 3 months as scheduled

## 2025-07-14 ENCOUNTER — APPOINTMENT (OUTPATIENT)
Dept: ORTHOPEDIC SURGERY | Facility: CLINIC | Age: 80
End: 2025-07-14
Payer: MEDICARE

## 2025-07-14 DIAGNOSIS — S62.662A CLOSED NONDISPLACED FRACTURE OF DISTAL PHALANX OF RIGHT MIDDLE FINGER, INITIAL ENCOUNTER: ICD-10-CM

## 2025-07-14 DIAGNOSIS — S60.10XA SUBUNGUAL HEMATOMA OF DIGIT OF HAND, INITIAL ENCOUNTER: ICD-10-CM

## 2025-07-14 DIAGNOSIS — S61.319A NAILBED LACERATION, FINGER, INITIAL ENCOUNTER: Primary | ICD-10-CM

## 2025-07-14 PROCEDURE — 1160F RVW MEDS BY RX/DR IN RCRD: CPT | Performed by: ORTHOPAEDIC SURGERY

## 2025-07-14 PROCEDURE — 26750 TREAT FINGER FRACTURE EACH: CPT | Performed by: ORTHOPAEDIC SURGERY

## 2025-07-14 PROCEDURE — 1159F MED LIST DOCD IN RCRD: CPT | Performed by: ORTHOPAEDIC SURGERY

## 2025-07-14 PROCEDURE — 99203 OFFICE O/P NEW LOW 30 MIN: CPT | Performed by: ORTHOPAEDIC SURGERY

## 2025-07-14 ASSESSMENT — PAIN SCALES - GENERAL: PAINLEVEL_OUTOF10: 10 - WORST POSSIBLE PAIN

## 2025-07-14 ASSESSMENT — ENCOUNTER SYMPTOMS
FEVER: 0
BRUISES/BLEEDS EASILY: 0
SHORTNESS OF BREATH: 0
ARTHRALGIAS: 1
FATIGUE: 0
WHEEZING: 0
CHILLS: 0

## 2025-07-14 ASSESSMENT — PAIN - FUNCTIONAL ASSESSMENT: PAIN_FUNCTIONAL_ASSESSMENT: 0-10

## 2025-07-14 NOTE — PROGRESS NOTES
Reason for Appointment  Chief Complaint   Patient presents with    Right Hand - Pain     History of Present Illness  New patient is a 80 y.o. female here today for evaluation of right hand pain. She went to the ED on 7/5/25 for a right long finger injury after she closed it in a car door on 7/4/25. She did have a subungual hematoma. Nail trephination performed and an oral antibiotic was placed. She was referred to ortho. X-rays taken of the right fingers on 7/5/25 were reviewed and showed Fracture the distal phalanx of the right third finger. Today she has continued stiffness swelling and tenderness. Isolated tenderness and injury. Distal tuft fracture and a large subungual hematoma. The pt reports no other recent falls or injuries. Past medical history allergies medications social and family history all reviewed.     Medical History[1]    Surgical History[2]    Medication Documentation Review Audit       Reviewed by Elvie Hogan MA (Medical Assistant) on 07/14/25 at 1126      Medication Order Taking? Sig Documenting Provider Last Dose Status   albuterol 2.5 mg /3 mL (0.083 %) nebulizer solution [id number] Yes Take 3 mL (2.5 mg) by nebulization every 4 hours if needed for wheezing. Use every 4-6 hours as needed for wheezing Georgie Cook APRN-CNP  Active   albuterol 90 mcg/actuation inhaler [id number] Yes Inhale 2 puffs every 4 hours if needed for wheezing (cough). Olvin Cui MD  Active   allopurinol (Zyloprim) 100 mg tablet [id number] Yes Take 1 tablet (100 mg) by mouth 3 times a day. Alvin Davis MD  Active   aspirin 81 mg EC tablet [id number] Yes Take 1 tablet (81 mg) by mouth once daily. Historical Provider, MD  Active   blood sugar diagnostic (Prodigy No Coding) strip [id number] Yes 1 strip 2 times a day. In vitro strip test twice daily Alvin Davis MD  Active   budesonide-glycopyr-formoterol (Breztri Aerosphere) 160-9-4.8 mcg/actuation HFA aerosol inhaler [id number] Yes Inhale 2 puffs 2 times a  day. Olvin Cui MD  Active   cholecalciferol (Vitamin D-3) 125 MCG (5000 UT) capsule [id number] Yes Take 1 capsule (125 mcg) by mouth 1 (one) time per week. Historical Provider, MD  Active   clindamycin (Cleocin) 300 mg capsule [id number] Yes Take 1 capsule (300 mg) by mouth 3 times a day for 10 days. Tierney Holt PA-C  Active   EPINEPHrine (Epipen) 0.3 mg/0.3 mL injection syringe [id number] Yes Inject 0.3 mL (0.3 mg) into the muscle 1 time if needed for anaphylaxis. Inject into upper leg. Call 911 after use. Georgie Cook APRN-CNP  Active   ferrous sulfate 325 (65 Fe) mg EC tablet [id number] Yes Take 1 tablet by mouth once daily with breakfast. Do not crush, chew, or split. Alvin Davis MD  Active   furosemide (Lasix) 20 mg tablet [id number] Yes Take 1 tablet (20 mg) by mouth once daily. Alvin Davis MD  Active   glipiZIDE (Glucotrol) 5 mg tablet [id number] Yes Take 1 tablet (5 mg) by mouth 2 times daily (morning and late afternoon). Alvin Davis MD  Active   lancets misc [id number] Yes Test twice daily Historical Provider, MD  Active   lidocaine 4 % patch [id number] Yes Place on the skin if needed. Place at the site of pain - use as directed Historical Provider, MD  Active   losartan (Cozaar) 50 mg tablet [id number] Yes Take 1 tablet (50 mg) by mouth once daily. Alvin Davis MD  Active   lovastatin (Mevacor) 20 mg tablet [id number] Yes Take 1 tablet (20 mg) by mouth once daily in the evening. Take with meals. Alvin Davis MD  Active   metFORMIN  mg 24 hr tablet [id number] Yes Take 1 tablet (500 mg) by mouth every 12 hours. Take with food Alvin Davis MD  Active   multivit with calcium,iron,min (MULTIPLE VITAMIN, WOMENS ORAL) [id number] Yes Take 1 tablet by mouth 1 (one) time each day. Historical Provider, MD  Active     Discontinued 07/08/25 1046   oxyCODONE-acetaminophen (Percocet) 5-325 mg tablet [id number] Yes Take 1 tablet by mouth every 12 hours if needed (for pain) for up to 7  days. Alvin Davis MD  Active   oxygen (O2) gas therapy [id number] Yes Inhale 1 each continuously. Historical Provider, MD  Active   potassium chloride CR (Klor-Con) 10 mEq ER tablet [id number] Yes Take 1 tablet (10 mEq) by mouth 2 times a day. Do not crush, chew, or split. Alvin Davis MD  Active                    RX Allergies[3]    Review of Systems   Constitutional:  Negative for chills, fatigue and fever.   Respiratory:  Negative for shortness of breath and wheezing.    Cardiovascular:  Negative for chest pain and leg swelling.   Musculoskeletal:  Positive for arthralgias.   Allergic/Immunologic: Negative for immunocompromised state.   Hematological:  Does not bruise/bleed easily.       Exam   Pt is alert awake, orientated to person place and time. No acute distress. Mood is good. Good pulses and good sensation. Negative Dangelo's sign. No auditory wheezes. No JVD.  No hyperreflexia. No skin changes. Good capillary refill. subungual hematoma of the right long finger that is drainage. No large laceration. Flexion extension intact. Expected stiffness seen. Good shoulder elbow hand writs rom. No signs of infection seen.     Assessment   Encounter Diagnosis   Name Primary?    Closed nondisplaced fracture of distal phalanx of right middle finger, initial encounter    Nailbed laceration  subungual hematoma, right long finger    Plan     We discussed getting this finger moving fully. She can do coban wrapping. Follow up 3 weeks. She can air this finger out and we discussed using a loose Band-Aid. We dicussed simple cleaning and wound care.       I, Raisa Armijo, attest that this documentation has been prepared under the direction and in the presence of Guille Mensah MD.   By signing below, I, Guille Mensah MD, personally performed the services described in this documentation. All medical record entries made by the scribe were at my direction and in my presence. I have reviewed the chart and agree that the record  reflects my personal performance and is accurate and complete.         [1]   Past Medical History:  Diagnosis Date    Displaced fracture of shaft of unspecified metacarpal bone, subsequent encounter for fracture with routine healing 09/14/2017    Closed avulsion fracture of shaft of metacarpal bone with routine healing, subsequent encounter    Effusion, left wrist 08/24/2017    Wrist swelling, left    Encounter for other screening for malignant neoplasm of breast 01/17/2020    Breast cancer screening    Encounter for screening for other viral diseases 08/07/2017    Need for hepatitis C screening test    Pain in left shoulder 11/22/2017    Shoulder pain, left    Pain in left wrist 08/15/2017    Left wrist pain    Personal history of other diseases of the circulatory system     History of hypertension    Personal history of other diseases of the respiratory system     History of chronic obstructive lung disease    Personal history of other endocrine, nutritional and metabolic disease     History of hyperlipidemia    Personal history of other endocrine, nutritional and metabolic disease 10/04/2022    History of type 2 diabetes mellitus    Unspecified injury of muscle(s) and tendon(s) of the rotator cuff of left shoulder, subsequent encounter 09/29/2017    Unspecified injury of muscle(s) and tendon(s) of the rotator cuff of left shoulder, subsequent encounter    Urinary tract infection, site not specified 04/16/2019    Acute UTI   [2]   Past Surgical History:  Procedure Laterality Date    HYSTERECTOMY  06/28/2016    Hysterectomy    KNEE ARTHROPLASTY  06/28/2016    Knee Arthroplasty    TONSILLECTOMY  06/28/2016    Tonsillectomy   [3]   Allergies  Allergen Reactions    Haloperidol Hives, Nausea And Vomiting and Other    Darvon [Propoxyphene] Unknown    Dilaudid [Hydromorphone] Unknown    Gabapentin Other     GI upset. Slept for 3 days     Losartan-Hydrochlorothiazide Other    Morphine Unknown    Nsaids (Non-Steroidal  Anti-Inflammatory Drug) Other    Penicillins Unknown    Tegretol [Carbamazepine] Unknown    Toradol [Ketorolac] Unknown    Tramadol Unknown    Propoxyphene N-Acetaminophen Other    Salicylates Unknown

## 2025-07-22 DIAGNOSIS — E87.6 HYPOKALEMIA: ICD-10-CM

## 2025-07-22 RX ORDER — POTASSIUM CHLORIDE 750 MG/1
TABLET, EXTENDED RELEASE ORAL
Qty: 180 TABLET | Refills: 1 | Status: SHIPPED | OUTPATIENT
Start: 2025-07-22

## 2025-07-28 ENCOUNTER — APPOINTMENT (OUTPATIENT)
Dept: ORTHOPEDIC SURGERY | Facility: CLINIC | Age: 80
End: 2025-07-28
Payer: MEDICARE

## 2025-07-28 ENCOUNTER — HOSPITAL ENCOUNTER (OUTPATIENT)
Dept: RADIOLOGY | Facility: CLINIC | Age: 80
Discharge: HOME | End: 2025-07-28
Payer: MEDICARE

## 2025-07-28 DIAGNOSIS — S61.319A NAILBED LACERATION, FINGER, INITIAL ENCOUNTER: ICD-10-CM

## 2025-07-28 DIAGNOSIS — S62.662A CLOSED NONDISPLACED FRACTURE OF DISTAL PHALANX OF RIGHT MIDDLE FINGER, INITIAL ENCOUNTER: Primary | ICD-10-CM

## 2025-07-28 DIAGNOSIS — S62.662A CLOSED NONDISPLACED FRACTURE OF DISTAL PHALANX OF RIGHT MIDDLE FINGER, INITIAL ENCOUNTER: ICD-10-CM

## 2025-07-28 PROCEDURE — 1159F MED LIST DOCD IN RCRD: CPT | Performed by: ORTHOPAEDIC SURGERY

## 2025-07-28 PROCEDURE — 99213 OFFICE O/P EST LOW 20 MIN: CPT | Performed by: ORTHOPAEDIC SURGERY

## 2025-07-28 PROCEDURE — 1160F RVW MEDS BY RX/DR IN RCRD: CPT | Performed by: ORTHOPAEDIC SURGERY

## 2025-07-28 PROCEDURE — 73130 X-RAY EXAM OF HAND: CPT | Mod: RT

## 2025-07-28 PROCEDURE — 73130 X-RAY EXAM OF HAND: CPT | Mod: RIGHT SIDE | Performed by: RADIOLOGY

## 2025-07-28 ASSESSMENT — PAIN - FUNCTIONAL ASSESSMENT: PAIN_FUNCTIONAL_ASSESSMENT: 0-10

## 2025-07-28 ASSESSMENT — PAIN SCALES - GENERAL: PAINLEVEL_OUTOF10: 10 - WORST POSSIBLE PAIN

## 2025-07-29 ASSESSMENT — ENCOUNTER SYMPTOMS
TROUBLE SWALLOWING: 0
CHILLS: 0
WOUND: 0
FEVER: 0
EYE DISCHARGE: 0
ARTHRALGIAS: 1
SHORTNESS OF BREATH: 0
WHEEZING: 0
JOINT SWELLING: 1

## 2025-07-29 NOTE — PROGRESS NOTES
Reason for Appointment  Chief Complaint   Patient presents with    Right Middle Finger - Follow-up, Post-op     History of Present Illness  Patient is a 80 y.o. female here today for follow-up evaluation of of her right long finger.  She crushed the finger in a car door about 4 weeks ago.  X-rays taken today are reviewed and show a right long finger tuft fracture.  Subungual hematoma looks excellent, dry with no signs of infection.  She does have significant stiffness at the DIP joint and we discussed getting this finger moving with her underlying arthritic change and she is not interested in formal therapy.  No other changes in her past medical history, allergies, or medications.    Medical History[1]    Surgical History[2]    Medication Documentation Review Audit       Reviewed by Elvie Hogan MA (Medical Assistant) on 07/28/25 at 1343      Medication Order Taking? Sig Documenting Provider Last Dose Status   albuterol 2.5 mg /3 mL (0.083 %) nebulizer solution [id number] Yes Take 3 mL (2.5 mg) by nebulization every 4 hours if needed for wheezing. Use every 4-6 hours as needed for wheezing Georgie Cook APRN-CNP  Active   albuterol 90 mcg/actuation inhaler [id number] Yes Inhale 2 puffs every 4 hours if needed for wheezing (cough). Olvin Cui MD  Active   allopurinol (Zyloprim) 100 mg tablet [id number] Yes Take 1 tablet (100 mg) by mouth 3 times a day. Alvin Davis MD  Active   aspirin 81 mg EC tablet [id number] Yes Take 1 tablet (81 mg) by mouth once daily. Historical Provider, MD  Active   blood sugar diagnostic (Prodigy No Coding) strip [id number] Yes 1 strip 2 times a day. In vitro strip test twice daily Alvin Davis MD  Active   budesonide-glycopyr-formoterol (Breztri Aerosphere) 160-9-4.8 mcg/actuation HFA aerosol inhaler [id number] Yes Inhale 2 puffs 2 times a day. Olvin Cui MD  Active   cholecalciferol (Vitamin D-3) 125 MCG (5000 UT) capsule [id number] Yes Take 1 capsule (125 mcg) by  mouth 1 (one) time per week. Historical Provider, MD  Active   EPINEPHrine (Epipen) 0.3 mg/0.3 mL injection syringe [id number] Yes Inject 0.3 mL (0.3 mg) into the muscle 1 time if needed for anaphylaxis. Inject into upper leg. Call 911 after use. Georgie Cook, APRN-CNP  Active   ferrous sulfate 325 (65 Fe) mg EC tablet [id number] Yes Take 1 tablet by mouth once daily with breakfast. Do not crush, chew, or split. Alvin Davis MD  Active   furosemide (Lasix) 20 mg tablet [id number] Yes Take 1 tablet (20 mg) by mouth once daily. Alvin Davis MD  Active   glipiZIDE (Glucotrol) 5 mg tablet [id number] Yes Take 1 tablet (5 mg) by mouth 2 times daily (morning and late afternoon). Alvin Davis MD  Active   lancets misc [id number] Yes Test twice daily Historical Provider, MD  Active   lidocaine 4 % patch [id number] Yes Place on the skin if needed. Place at the site of pain - use as directed Historical Provider, MD  Active   losartan (Cozaar) 50 mg tablet [id number] Yes Take 1 tablet (50 mg) by mouth once daily. Alvin Davis MD  Active   lovastatin (Mevacor) 20 mg tablet [id number] Yes Take 1 tablet (20 mg) by mouth once daily in the evening. Take with meals. Alvin Davis MD  Active   metFORMIN  mg 24 hr tablet [id number] Yes Take 1 tablet (500 mg) by mouth every 12 hours. Take with food Alvin Davis MD  Active   multivit with calcium,iron,min (MULTIPLE VITAMIN, WOMENS ORAL) [id number] Yes Take 1 tablet by mouth 1 (one) time each day. Historical Provider, MD  Active   oxygen (O2) gas therapy [id number] Yes Inhale 1 Dose continuously. Historical Provider, MD  Active     Discontinued 07/22/25 1558   potassium chloride CR 10 mEq ER tablet [id number] Yes TAKE 1 TABLET (10 MEQ) BY MOUTH 2 TIMES A DAY. DO NOT CRUSH, CHEW, OR SPLIT. Alvin Davis MD  Active                    RX Allergies[3]    Review of Systems   Constitutional:  Negative for chills and fever.   HENT:  Negative for postnasal drip, sneezing and  trouble swallowing.    Eyes:  Negative for discharge.   Respiratory:  Negative for shortness of breath and wheezing.    Cardiovascular:  Negative for chest pain.   Musculoskeletal:  Positive for arthralgias and joint swelling.   Skin:  Negative for rash and wound.   All other systems reviewed and are negative.    Exam   On exam the right long finger shows some mild swelling and a large subungual hematoma, no active drainage or signs of infection today.  She has stiffness at the DIP joint and PIP joint.  Tender over the distal phalanx.  Good pulses and sensation in the upper extremity.    Assessment   Encounter Diagnosis   Name Primary?    Closed nondisplaced fracture of distal phalanx of right middle finger, initial encounter    Right long finger nailbed laceration with subungual hematoma    Plan   Again, we discussed getting this finger moving as she is getting stiff.  She is not interested in any formal therapy and we went over home exercises with her today.  She can continue to do simple soaks and wound care.  Any increased redness, swelling, drainage she should follow-up with us immediately.  She had the fracture but also has the nailbed laceration and most likely at some point this nail will lifted off and she needs to be careful she can keep a Band-Aid on this to keep this down no infection no need for any antibiotic.  Any increased redness problems she can call us immediately.  We did offer her therapy to get moving but she does not have any interest.    I, Arelis Washington PA-C, am acting as a scribe and attest that this documentation has been prepared under the direction and in the presence of Guille Mensah MD.  By signing below, I, Guille Mensah MD, personally performed the services described in this documentation. All medical record entries made by the scribe were at my direction and in my presence. I have reviewed the chart and agree that the record reflects my personal performance and is accurate and  complete.                         [1]   Past Medical History:  Diagnosis Date    Displaced fracture of shaft of unspecified metacarpal bone, subsequent encounter for fracture with routine healing 09/14/2017    Closed avulsion fracture of shaft of metacarpal bone with routine healing, subsequent encounter    Effusion, left wrist 08/24/2017    Wrist swelling, left    Encounter for other screening for malignant neoplasm of breast 01/17/2020    Breast cancer screening    Encounter for screening for other viral diseases 08/07/2017    Need for hepatitis C screening test    Pain in left shoulder 11/22/2017    Shoulder pain, left    Pain in left wrist 08/15/2017    Left wrist pain    Personal history of other diseases of the circulatory system     History of hypertension    Personal history of other diseases of the respiratory system     History of chronic obstructive lung disease    Personal history of other endocrine, nutritional and metabolic disease     History of hyperlipidemia    Personal history of other endocrine, nutritional and metabolic disease 10/04/2022    History of type 2 diabetes mellitus    Unspecified injury of muscle(s) and tendon(s) of the rotator cuff of left shoulder, subsequent encounter 09/29/2017    Unspecified injury of muscle(s) and tendon(s) of the rotator cuff of left shoulder, subsequent encounter    Urinary tract infection, site not specified 04/16/2019    Acute UTI   [2]   Past Surgical History:  Procedure Laterality Date    HYSTERECTOMY  06/28/2016    Hysterectomy    KNEE ARTHROPLASTY  06/28/2016    Knee Arthroplasty    TONSILLECTOMY  06/28/2016    Tonsillectomy   [3]   Allergies  Allergen Reactions    Haloperidol Hives, Nausea And Vomiting and Other    Darvon [Propoxyphene] Unknown    Dilaudid [Hydromorphone] Unknown    Gabapentin Other     GI upset. Slept for 3 days     Losartan-Hydrochlorothiazide Other    Morphine Unknown    Nsaids (Non-Steroidal Anti-Inflammatory Drug) Other     Penicillins Unknown    Tegretol [Carbamazepine] Unknown    Toradol [Ketorolac] Unknown    Tramadol Unknown    Propoxyphene N-Acetaminophen Other    Salicylates Unknown

## 2025-08-07 ENCOUNTER — APPOINTMENT (OUTPATIENT)
Dept: PULMONOLOGY | Facility: CLINIC | Age: 80
End: 2025-08-07
Payer: MEDICARE

## 2025-08-07 VITALS
HEART RATE: 62 BPM | SYSTOLIC BLOOD PRESSURE: 148 MMHG | OXYGEN SATURATION: 94 % | DIASTOLIC BLOOD PRESSURE: 79 MMHG | BODY MASS INDEX: 22.28 KG/M2 | WEIGHT: 125.8 LBS

## 2025-08-07 DIAGNOSIS — J44.9 CHRONIC OBSTRUCTIVE PULMONARY DISEASE, UNSPECIFIED COPD TYPE (MULTI): Primary | ICD-10-CM

## 2025-08-07 DIAGNOSIS — R09.02 HYPOXIA: ICD-10-CM

## 2025-08-07 PROCEDURE — 1160F RVW MEDS BY RX/DR IN RCRD: CPT | Performed by: PEDIATRICS

## 2025-08-07 PROCEDURE — 99215 OFFICE O/P EST HI 40 MIN: CPT | Performed by: PEDIATRICS

## 2025-08-07 PROCEDURE — 3078F DIAST BP <80 MM HG: CPT | Performed by: PEDIATRICS

## 2025-08-07 PROCEDURE — 1159F MED LIST DOCD IN RCRD: CPT | Performed by: PEDIATRICS

## 2025-08-07 PROCEDURE — 3077F SYST BP >= 140 MM HG: CPT | Performed by: PEDIATRICS

## 2025-08-07 NOTE — PROGRESS NOTES
Subjective   Patient ID: Hali Christianson is a 80 y.o. female who presents for COPD/emphysema, hypoxia    HPI    10/26/2022:  New patient here today to establish care. Patient recently moved from [place] to stay with her daughter here. While in [place] she was seeing a pulmonologist. She gave us the name of the pulmonologist so we can contact his office for records. She is doing well other than when she uses the Trelegy she gags and will vomit. I really think it is the powdered inhaler. She says she tries to rinse and everything but is not working for her she was fine with Symbicort so I would like to try Breztri. She continues to use 3 L of oxygen she said she started oxygen about 8 or 9 months ago during the hospital stay. She tells me she had a PFT for 5 months ago with her doctor in [place]. We will call to get results. She did have a chest CT which did show some small nodules the largest being 6 mm. Also showed emphysematous changes and some bullae. Patient purchased her own [employer] daily concentrator. I would like her to have a nebulizer with albuterol for home use     03/09/23 she is here today for follow-up. She has been doing well. She does like the Breztri and she has to use her rescue inhaler only on occasion. She continues on 3 L of oxygen. We talked about measuring her oxygen at home but she cannot afford a pulse oximeter. Going to order one through the pharmacy and see if it is covered. She she complains that she does have rhinitis I will send in a nasal spray for her. She will be due for a new chest CT in October 12/27/23 she is here today for follow-up.  She has been doing well.  She continues to use Breztri daily uses her albuterol twice a week.  She has had no visits to the ER for respiratory.  Her only visit was for bee sting.  She uses 3 L of oxygen continuously.  She did take her O2 off she walked to the scale to get weighed back to the exam room and she was only 82%.  She put her 3 L of O2  back on and osorio to 93%.     07/01/24 she is here today for follow-up.  She has been doing well.  Upon arrival she was 79% on 3 L.  She is on pulsed patient seems to be a mouth breather especially when she is speaking.  I had her sit and do some breathing through her nose and she quickly osorio to 90%.  At home she uses her concentrator which is continuous airflow.  She uses her Breztri and albuterol as needed.  She would be a great candidate for pulmonary rehab but currently is without a vehicle and wants to wait until she obtains 1      2/6/2025:  Ms Christianson is doing well.  She came in without her POC and her saturation was 85%.  She had the POC but the battery ran out.  She is using breztri and albuterol as needed.  We discussed pulmonary rahab but she wants to wait until nicer weather.     8/7/2025:  Ms Christianson is doing well, she is using breztri and albuterol as needed.  She changed DME to [facility], she has a home concentrator but did not get a POC.  She did not bring her oxygen with her due to the tanks being too heavy.   Her saturation was 82% in room air       Previous pulmonary history:   She has no history of recurrent infections, or lung disease as a child. She was previously told she has copd . She currently is on supplemental oxygen. She has never been to pulmonary rehab. Does not recall having AECOPD requiring antibiotics or prednisone.     Inhalers/nebulized medications: Trelegy and albuterol      Hospitalization History:  She has not been hospitalized over the last year for breathing related problem.     Sleep history:  Denies snoring, apneas, feeling tired during the day or taking naps during the day..  Does not take frequent naps. Does not feel tired during the day or take frequent naps.  STOP-BANG score of      Comorbidities:  Hypertension  Diabetes  Diabetic neuropathy     SH:  smoking:former   drinking: none  illicit drug use: none     Occupation: (Full questionnaire on exposures obtained, discussed  with the patient and scanned to EMR)  worked as   No known exposure to asbestos, silica or beryllium     Family History:  No family history of lung diseases or cancer     Imaging history: (I have personally reviewed the imaging below)  1/7/2023:  severe emphysema     PFTs:  2/2/2022 ([facility]):  %, FEV1 119%, FEF 25-75 86%, TLC 91%, DLCO 24%     6 MWTs: None on record    Review of Systems    See scanned documents attached to this note for review of systems, and appropriate scales/scores for this visit.     Objective   Physical Exam  Constitutional:       Appearance: Normal appearance.   HENT:      Head: Normocephalic and atraumatic.      Mouth/Throat:      Pharynx: Oropharynx is clear.     Cardiovascular:      Rate and Rhythm: Normal rate and regular rhythm.      Pulses: Normal pulses.      Heart sounds: Normal heart sounds.   Pulmonary:      Effort: Pulmonary effort is normal.      Breath sounds: Normal breath sounds. No wheezing, rhonchi or rales.   Abdominal:      General: Bowel sounds are normal.      Palpations: Abdomen is soft.     Musculoskeletal:         General: Normal range of motion.     Skin:     General: Skin is warm and dry.     Neurological:      General: No focal deficit present.      Mental Status: She is alert and oriented to person, place, and time.     Psychiatric:         Mood and Affect: Mood normal.         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 She has been feeling well. Needs refills on her meds    07/01/24 She continues on Breztri and uses her albuterol as needed sometimes 2x a day.  She would be a great candidate for pulmonary rehabilitation.  We talked about this today but patient is working on getting a car and then she would be able to do pulmonary rehab.  2/6/2025:  continue breztri and albuterol.  Instructed her  to call if she wants to start pulmonary rehab this spring or summer.   8/7/2025:  continue breztri and albuterol wants to hold off on pulmonary rehab for now     #Chronic respiratory failure   -Patient uses 3 liters of oxygen continuously   - She can benefit from a pulse oximeter at home so she can monitor her oxygen  12/27/23 She uses 3L and was only 82 % when she took her 02 off to walk to get weighed. When she put her 02 back on she osorio to 93%   07/01/24 she continues on 3L she was 79 when she arrived but is not sitting comfortably at 90%. She is on pulsed and has to remember to take breathes through her nose. She just had her 02 concentrator replaced from [facility]  2/6/2025:  using oxygen continuously   8/7/2025: O2 at 3lpm continuously. [facility] will deliver a POC.  Sent her home with one of our tanks     # Lung nodule   - 6mm RML lung nodule   -High risk since she was a smoker   -We will repeat in 1 year 10/23   12/27/23 she had a chest Ct in September. Showing a 6 mm nodule unchanged from 9/2022           Please follow-up in 6 months        Olvin Cui MD 08/07/25 9:03 AM

## 2025-08-21 ENCOUNTER — TELEPHONE (OUTPATIENT)
Dept: PULMONOLOGY | Facility: CLINIC | Age: 80
End: 2025-08-21
Payer: MEDICARE

## 2025-10-08 ENCOUNTER — APPOINTMENT (OUTPATIENT)
Dept: PRIMARY CARE | Facility: CLINIC | Age: 80
End: 2025-10-08
Payer: MEDICARE

## 2026-02-09 ENCOUNTER — APPOINTMENT (OUTPATIENT)
Dept: PULMONOLOGY | Facility: CLINIC | Age: 81
End: 2026-02-09
Payer: MEDICARE